# Patient Record
Sex: FEMALE | Race: WHITE | NOT HISPANIC OR LATINO
[De-identification: names, ages, dates, MRNs, and addresses within clinical notes are randomized per-mention and may not be internally consistent; named-entity substitution may affect disease eponyms.]

---

## 2017-03-30 ENCOUNTER — APPOINTMENT (OUTPATIENT)
Dept: OBGYN | Facility: CLINIC | Age: 22
End: 2017-03-30

## 2017-03-30 VITALS
DIASTOLIC BLOOD PRESSURE: 64 MMHG | OXYGEN SATURATION: 99 % | HEART RATE: 92 BPM | BODY MASS INDEX: 19.99 KG/M2 | SYSTOLIC BLOOD PRESSURE: 100 MMHG | WEIGHT: 135 LBS | HEIGHT: 69 IN

## 2017-03-30 DIAGNOSIS — N89.8 OTHER SPECIFIED NONINFLAMMATORY DISORDERS OF VAGINA: ICD-10-CM

## 2017-03-30 RX ORDER — DROSPIRENONE, ETHINYL ESTRADIOL AND LEVOMEFOLATE CALCIUM AND LEVOMEFOLATE CALCIUM 3-0.02(24)
3-0.02-0.451 KIT ORAL
Qty: 84 | Refills: 3 | Status: ACTIVE | COMMUNITY
Start: 2017-03-30 | End: 1900-01-01

## 2017-03-31 ENCOUNTER — OTHER (OUTPATIENT)
Age: 22
End: 2017-03-31

## 2017-03-31 ENCOUNTER — CLINICAL ADVICE (OUTPATIENT)
Age: 22
End: 2017-03-31

## 2017-03-31 LAB
CANDIDA VAG CYTO: NOT DETECTED
G VAGINALIS+PREV SP MTYP VAG QL MICRO: NOT DETECTED
T VAGINALIS VAG QL WET PREP: NOT DETECTED

## 2017-04-03 LAB
BACTERIA UR CULT: NORMAL
C TRACH RRNA SPEC QL NAA+PROBE: NORMAL
N GONORRHOEA RRNA SPEC QL NAA+PROBE: NORMAL
SOURCE AMPLIFICATION: NORMAL

## 2017-04-04 ENCOUNTER — OTHER (OUTPATIENT)
Age: 22
End: 2017-04-04

## 2017-06-12 ENCOUNTER — OTHER (OUTPATIENT)
Age: 22
End: 2017-06-12

## 2017-06-20 ENCOUNTER — OTHER (OUTPATIENT)
Age: 22
End: 2017-06-20

## 2018-03-02 ENCOUNTER — APPOINTMENT (OUTPATIENT)
Dept: OTOLARYNGOLOGY | Facility: CLINIC | Age: 23
End: 2018-03-02
Payer: COMMERCIAL

## 2018-03-02 VITALS
HEIGHT: 69 IN | SYSTOLIC BLOOD PRESSURE: 106 MMHG | OXYGEN SATURATION: 99 % | HEART RATE: 70 BPM | WEIGHT: 136 LBS | DIASTOLIC BLOOD PRESSURE: 74 MMHG | BODY MASS INDEX: 20.14 KG/M2 | RESPIRATION RATE: 16 BRPM

## 2018-03-02 DIAGNOSIS — J30.81 ALLERGIC RHINITIS DUE TO ANIMAL (CAT) (DOG) HAIR AND DANDER: ICD-10-CM

## 2018-03-02 DIAGNOSIS — J01.41 ACUTE RECURRENT PANSINUSITIS: ICD-10-CM

## 2018-03-02 DIAGNOSIS — J30.89 OTHER ALLERGIC RHINITIS: ICD-10-CM

## 2018-03-02 DIAGNOSIS — J30.1 ALLERGIC RHINITIS DUE TO POLLEN: ICD-10-CM

## 2018-03-02 DIAGNOSIS — J34.2 DEVIATED NASAL SEPTUM: ICD-10-CM

## 2018-03-02 PROCEDURE — 99204 OFFICE O/P NEW MOD 45 MIN: CPT | Mod: 25

## 2018-03-02 PROCEDURE — 31231 NASAL ENDOSCOPY DX: CPT

## 2018-03-02 RX ORDER — FLUTICASONE PROPIONATE 50 UG/1
50 SPRAY, METERED NASAL
Refills: 0 | Status: ACTIVE | COMMUNITY

## 2018-03-02 RX ORDER — FLUCONAZOLE 150 MG/1
150 TABLET ORAL
Qty: 1 | Refills: 0 | Status: ACTIVE | COMMUNITY
Start: 2018-03-02 | End: 1900-01-01

## 2018-03-02 RX ORDER — AMOXICILLIN AND CLAVULANATE POTASSIUM 875; 125 MG/1; MG/1
875-125 TABLET, COATED ORAL
Qty: 20 | Refills: 0 | Status: ACTIVE | COMMUNITY
Start: 2018-03-02 | End: 1900-01-01

## 2018-05-09 ENCOUNTER — APPOINTMENT (OUTPATIENT)
Dept: OBGYN | Facility: CLINIC | Age: 23
End: 2018-05-09
Payer: COMMERCIAL

## 2018-05-09 VITALS
HEIGHT: 69 IN | HEART RATE: 74 BPM | DIASTOLIC BLOOD PRESSURE: 56 MMHG | SYSTOLIC BLOOD PRESSURE: 108 MMHG | BODY MASS INDEX: 19.99 KG/M2 | WEIGHT: 135 LBS

## 2018-05-09 DIAGNOSIS — Z01.419 ENCOUNTER FOR GYNECOLOGICAL EXAMINATION (GENERAL) (ROUTINE) W/OUT ABNORMAL FINDINGS: ICD-10-CM

## 2018-05-09 DIAGNOSIS — Z30.41 ENCOUNTER FOR SURVEILLANCE OF CONTRACEPTIVE PILLS: ICD-10-CM

## 2018-05-09 PROCEDURE — 99395 PREV VISIT EST AGE 18-39: CPT

## 2018-05-09 RX ORDER — DROSPIRENONE/ETHINYL ESTRADIOL/LEVOMEFOLATE CALCIUM AND LEVOMEFOLATE CALCIUM 3-0.02(24)
3-0.02-0.451 KIT ORAL
Qty: 3 | Refills: 3 | Status: ACTIVE | COMMUNITY
Start: 2018-05-09 | End: 1900-01-01

## 2018-05-14 ENCOUNTER — MEDICATION RENEWAL (OUTPATIENT)
Age: 23
End: 2018-05-14

## 2018-05-14 ENCOUNTER — OTHER (OUTPATIENT)
Age: 23
End: 2018-05-14

## 2018-05-15 ENCOUNTER — OTHER (OUTPATIENT)
Age: 23
End: 2018-05-15

## 2018-05-16 ENCOUNTER — OTHER (OUTPATIENT)
Age: 23
End: 2018-05-16

## 2018-05-16 DIAGNOSIS — N76.0 ACUTE VAGINITIS: ICD-10-CM

## 2018-05-16 DIAGNOSIS — B96.89 ACUTE VAGINITIS: ICD-10-CM

## 2018-05-17 ENCOUNTER — OTHER (OUTPATIENT)
Age: 23
End: 2018-05-17

## 2018-05-17 LAB
C TRACH RRNA SPEC QL NAA+PROBE: NOT DETECTED
CANDIDA VAG CYTO: NOT DETECTED
CYTOLOGY CVX/VAG DOC THIN PREP: NORMAL
G VAGINALIS+PREV SP MTYP VAG QL MICRO: DETECTED
N GONORRHOEA RRNA SPEC QL NAA+PROBE: NOT DETECTED
SOURCE AMPLIFICATION: NORMAL
T VAGINALIS VAG QL WET PREP: NOT DETECTED

## 2018-05-17 RX ORDER — METRONIDAZOLE 7.5 MG/G
0.75 GEL VAGINAL
Qty: 1 | Refills: 0 | Status: ACTIVE | COMMUNITY
Start: 2018-05-16 | End: 1900-01-01

## 2018-05-17 RX ORDER — DROSPIRENONE/ETHINYL ESTRADIOL/LEVOMEFOLATE CALCIUM AND LEVOMEFOLATE CALCIUM 3-0.03(21)
3-0.03-0.451 KIT ORAL DAILY
Qty: 90 | Refills: 3 | Status: ACTIVE | COMMUNITY
Start: 2018-05-17 | End: 1900-01-01

## 2018-05-18 ENCOUNTER — OTHER (OUTPATIENT)
Age: 23
End: 2018-05-18

## 2018-05-21 ENCOUNTER — RX RENEWAL (OUTPATIENT)
Age: 23
End: 2018-05-21

## 2018-05-21 RX ORDER — DROSPIRENONE/ETHINYL ESTRADIOL/LEVOMEFOLATE CALCIUM AND LEVOMEFOLATE CALCIUM 3-0.03(21)
3-0.03-0.451 KIT ORAL DAILY
Qty: 28 | Refills: 6 | Status: ACTIVE | COMMUNITY
Start: 2018-05-21 | End: 1900-01-01

## 2018-05-24 ENCOUNTER — OTHER (OUTPATIENT)
Age: 23
End: 2018-05-24

## 2018-05-24 ENCOUNTER — CHART COPY (OUTPATIENT)
Age: 23
End: 2018-05-24

## 2019-04-29 ENCOUNTER — TRANSCRIPTION ENCOUNTER (OUTPATIENT)
Age: 24
End: 2019-04-29

## 2019-06-09 RX ORDER — DROSPIRENONE/ETHINYL ESTRADIOL/LEVOMEFOLATE CALCIUM AND LEVOMEFOLATE CALCIUM 3-0.03(21)
3-0.03-0.451 KIT ORAL
Qty: 1 | Refills: 0 | Status: ACTIVE | COMMUNITY
Start: 2018-05-24 | End: 1900-01-01

## 2020-03-05 ENCOUNTER — TELEPHONE (OUTPATIENT)
Dept: OBSTETRICS AND GYNECOLOGY | Facility: CLINIC | Age: 25
End: 2020-03-05

## 2020-03-05 NOTE — TELEPHONE ENCOUNTER
Left message informing patient that we need to push her appointment time back from 11 to 1:30pm on tomorrow. Dr. Jeansonne has an emergency surgery at that time.Left call back of [phone] and will send message on [facility].

## 2020-03-06 ENCOUNTER — PATIENT MESSAGE (OUTPATIENT)
Dept: OBSTETRICS AND GYNECOLOGY | Facility: CLINIC | Age: 25
End: 2020-03-06

## 2020-03-27 ENCOUNTER — OFFICE VISIT (OUTPATIENT)
Dept: OBSTETRICS AND GYNECOLOGY | Facility: CLINIC | Age: 25
End: 2020-03-27
Attending: OBSTETRICS & GYNECOLOGY
Payer: COMMERCIAL

## 2020-03-27 ENCOUNTER — PATIENT MESSAGE (OUTPATIENT)
Dept: OBSTETRICS AND GYNECOLOGY | Facility: CLINIC | Age: 25
End: 2020-03-27

## 2020-03-27 DIAGNOSIS — Z30.014 ENCOUNTER FOR INITIAL PRESCRIPTION OF INTRAUTERINE CONTRACEPTIVE DEVICE (IUD): Primary | ICD-10-CM

## 2020-03-27 DIAGNOSIS — N89.8 VAGINAL ODOR: ICD-10-CM

## 2020-03-27 PROCEDURE — 99203 OFFICE O/P NEW LOW 30 MIN: CPT | Mod: 95,,, | Performed by: OBSTETRICS & GYNECOLOGY

## 2020-03-27 PROCEDURE — 99203 PR OFFICE/OUTPT VISIT, NEW, LEVL III, 30-44 MIN: ICD-10-PCS | Mod: 95,,, | Performed by: OBSTETRICS & GYNECOLOGY

## 2020-03-27 RX ORDER — METRONIDAZOLE 7.5 MG/G
1 GEL VAGINAL NIGHTLY
Qty: 70 G | Refills: 0 | Status: SHIPPED | OUTPATIENT
Start: 2020-03-27 | End: 2020-04-01

## 2020-03-27 NOTE — PROGRESS NOTES
The patient location is: home  The chief complaint leading to consultation is: IUD consult, vaginal odor  Visit type: Virtual visit with synchronous audio and video  Total time spent with patient: 15 min  Each patient to whom he or she provides medical services by telemedicine is:  (1) informed of the relationship between the physician and patient and the respective role of any other health care provider with respect to management of the patient; and (2) notified that he or she may decline to receive medical services by telemedicine and may withdraw from such care at any time.    Notes:     CC: IUD consult, Vaginal odor, h/o BV    Precious Perez is a 24 y.o. female G0 presents to discuss IUD options. She has been on Loloestrin and Beyaz since age 15yo and wants a non-hormonal method, thinks she wants paraguard. Insurance does not cover BC so will be paying out of pocket.    Also c/o vaginal discharge and odor, has had BV in past and thinks this is it again.    Has not seen an OB/GYN in 2 years.    History reviewed. No pertinent past medical history.  History reviewed. No pertinent surgical history.  Social History     Tobacco Use    Smoking status: Not on file   Substance Use Topics    Alcohol use: Not on file    Drug use: Not on file     History reviewed. No pertinent family history.  OB History   No data available       There were no vitals taken for this visit.    ROS:  GENERAL: No fever, chills, fatigability or weight loss.  VULVAR: No pain, no lesions and no itching.  VAGINAL: No relaxation, no itching, no discharge, no abnormal bleeding and no lesions.  ABDOMEN: No abdominal pain. Denies nausea. Denies vomiting. No diarrhea. No constipation  BREAST: Denies pain. No lumps. No discharge.  URINARY: No incontinence, no nocturia, no frequency and no dysuria.  CARDIOVASCULAR: No chest pain. No shortness of breath. No leg cramps.  NEUROLOGICAL: No headaches. No vision changes.    PHYSICAL EXAM:  GEN: NAD  Resp:  nl effort  Abd: deferred, televisit  Pelvic: deferred, televisit  Psych: nl affect    ASSESSMENT and PLAN:    ICD-10-CM ICD-9-CM    1. Encounter for initial prescription of intrauterine contraceptive device (IUD) Z30.014 V25.02    2. Vaginal odor N89.8 625.8        We Discussed in depth the types of IUDs. We thoroughly reviewed the risks and benefits of all options and all questions were answered.     The patient has opted to proceed with paraguard for her birth control. Order placed, will let pt know cost and will schedule for placement with cytotec.    Metrogel sent to pharmacy for suspected BV.    Patient was counseled today on ACS guidelines and recommendations for yearly pelvic exams, mammograms and monthly self breast exams; to see her PCP for other health maintenance.

## 2020-04-02 ENCOUNTER — PATIENT MESSAGE (OUTPATIENT)
Dept: OBSTETRICS AND GYNECOLOGY | Facility: CLINIC | Age: 25
End: 2020-04-02

## 2020-08-06 NOTE — PROGRESS NOTES
"  Subjective:       Patient ID:  Precious Perez is a 24 y.o. female who presents for   Chief Complaint   Patient presents with    Acne     The patient location is: home  The chief complaint leading to consultation is: "acne"  Visit type: virtual visit with synchronous audio and video  Total time spent with patient: 11 minutes  Each patient to whom I provide medical services by telemedicine is:  (1) informed of the relationship between the physician and patient and the respective role of any other health care provider with respect to management of the patient; and (2) notified that he or she may decline to receive medical services by telemedicine and may withdraw from such care at any time.      Acne - Initial  Affected locations: face and chin  Duration: In the past month.  Signs / symptoms: inflamed, pain and redness  Severity: mild to moderate  Timing: It is always present, but gets better and worse.  Aggravated by: picking and stress  Treatments tried: azelaic acid, zinc, topical clindamycin.  Improvement on treatment: no relief      Review of Systems   Eyes: Negative for eyelid inflammation.   Skin: Negative for itching and rash.        Objective:    Physical Exam   Constitutional: She appears well-developed and well-nourished. No distress.   HENT:   Mouth/Throat: Lips normal.    Eyes: Lids are normal.  No conjunctival no injection.   Neurological: She is alert and oriented to person, place, and time. She is not disoriented.   Psychiatric: She has a normal mood and affect.   Skin:   Areas Examined (abnormalities noted in diagram):   Head / Face Inspection Performed  Neck Inspection Performed                 Diagram Legend     Erythematous scaling macule/papule c/w actinic keratosis       Vascular papule c/w angioma      Pigmented verrucoid papule/plaque c/w seborrheic keratosis      Yellow umbilicated papule c/w sebaceous hyperplasia      Irregularly shaped tan macule c/w lentigo     1-2 mm smooth white " papules consistent with Milia      Movable subcutaneous cyst with punctum c/w epidermal inclusion cyst      Subcutaneous movable cyst c/w pilar cyst      Firm pink to brown papule c/w dermatofibroma      Pedunculated fleshy papule(s) c/w skin tag(s)      Evenly pigmented macule c/w junctional nevus     Mildly variegated pigmented, slightly irregular-bordered macule c/w mildly atypical nevus      Flesh colored to evenly pigmented papule c/w intradermal nevus       Pink pearly papule/plaque c/w basal cell carcinoma      Erythematous hyperkeratotic cursted plaque c/w SCC      Surgical scar with no sign of skin cancer recurrence      Open and closed comedones      Inflammatory papules and pustules      Verrucoid papule consistent consistent with wart     Erythematous eczematous patches and plaques     Dystrophic onycholytic nail with subungual debris c/w onychomycosis     Umbilicated papule    Erythematous-base heme-crusted tan verrucoid plaque consistent with inflamed seborrheic keratosis     Erythematous Silvery Scaling Plaque c/w Psoriasis     See annotation      Assessment / Plan:        Periorificial dermatitis  -     doxycycline (PERIOSTAT) 20 MG tablet; Take 1 tablet (20 mg total) by mouth 2 (two) times daily.  Dispense: 60 tablet; Refill: 1  Prescribed azelaic acid 15% + ivermectin 1% + metronidazole 1% cream. Apply to face daily - BID.    Follow up in about 2 months (around 10/7/2020).

## 2020-08-07 ENCOUNTER — OFFICE VISIT (OUTPATIENT)
Dept: DERMATOLOGY | Facility: CLINIC | Age: 25
End: 2020-08-07
Payer: COMMERCIAL

## 2020-08-07 DIAGNOSIS — L71.0 PERIORIFICIAL DERMATITIS: Primary | ICD-10-CM

## 2020-08-07 PROCEDURE — 99202 PR OFFICE/OUTPT VISIT, NEW, LEVL II, 15-29 MIN: ICD-10-PCS | Mod: 95,,, | Performed by: DERMATOLOGY

## 2020-08-07 PROCEDURE — 99202 OFFICE O/P NEW SF 15 MIN: CPT | Mod: 95,,, | Performed by: DERMATOLOGY

## 2020-08-07 RX ORDER — DOXYCYCLINE HYCLATE 20 MG
20 TABLET ORAL 2 TIMES DAILY
Qty: 60 TABLET | Refills: 1 | Status: SHIPPED | OUTPATIENT
Start: 2020-08-07 | End: 2021-10-28

## 2020-08-07 RX ORDER — AZELASTINE HYDROCHLORIDE, FLUTICASONE PROPIONATE 137; 50 UG/1; UG/1
SPRAY, METERED NASAL
COMMUNITY
Start: 2020-07-08

## 2020-08-07 RX ORDER — MONTELUKAST SODIUM 5 MG/1
TABLET, CHEWABLE ORAL
COMMUNITY
Start: 2020-07-03

## 2020-08-07 RX ORDER — DESLORATADINE 5 MG/1
TABLET ORAL
COMMUNITY
Start: 2020-07-10

## 2020-12-16 PROBLEM — Z01.419 ENCOUNTER FOR ANNUAL ROUTINE GYNECOLOGICAL EXAMINATION: Status: RESOLVED | Noted: 2017-03-30 | Resolved: 2020-12-16

## 2020-12-16 PROBLEM — N76.0 BACTERIAL VAGINOSIS: Status: RESOLVED | Noted: 2018-05-16 | Resolved: 2020-12-16

## 2021-03-01 ENCOUNTER — CLINICAL SUPPORT (OUTPATIENT)
Dept: URGENT CARE | Facility: CLINIC | Age: 26
End: 2021-03-01

## 2021-03-01 DIAGNOSIS — Z02.89 ENCOUNTER FOR PHYSICAL EXAMINATION RELATED TO EMPLOYMENT: Primary | ICD-10-CM

## 2021-03-01 DIAGNOSIS — Z02.83 ENCOUNTER FOR DRUG SCREENING: ICD-10-CM

## 2021-03-01 PROCEDURE — 99499 UNLISTED E&M SERVICE: CPT | Mod: S$GLB,,, | Performed by: PHYSICIAN ASSISTANT

## 2021-03-01 PROCEDURE — 80306 OOH DOT DRUG SCREEN: ICD-10-PCS | Mod: S$GLB,,, | Performed by: PHYSICIAN ASSISTANT

## 2021-03-01 PROCEDURE — 99499 COAST GUARD PHYSICAL: ICD-10-PCS | Mod: S$GLB,,, | Performed by: PHYSICIAN ASSISTANT

## 2021-03-01 PROCEDURE — 80306 DRUG TEST PRSMV INSTRMNT: CPT | Mod: S$GLB,,, | Performed by: PHYSICIAN ASSISTANT

## 2021-04-15 ENCOUNTER — OFFICE VISIT (OUTPATIENT)
Dept: DERMATOLOGY | Facility: CLINIC | Age: 26
End: 2021-04-15
Payer: COMMERCIAL

## 2021-04-15 DIAGNOSIS — L98.9 DISEASE OF SKIN AND SUBCUTANEOUS TISSUE: Primary | ICD-10-CM

## 2021-04-15 PROCEDURE — 1126F PR PAIN SEVERITY QUANTIFIED, NO PAIN PRESENT: ICD-10-PCS | Mod: S$GLB,,, | Performed by: DERMATOLOGY

## 2021-04-15 PROCEDURE — 11900 INJECT SKIN LESIONS </W 7: CPT | Mod: S$GLB,,, | Performed by: DERMATOLOGY

## 2021-04-15 PROCEDURE — 1126F AMNT PAIN NOTED NONE PRSNT: CPT | Mod: S$GLB,,, | Performed by: DERMATOLOGY

## 2021-04-15 PROCEDURE — 99499 UNLISTED E&M SERVICE: CPT | Mod: S$GLB,,, | Performed by: DERMATOLOGY

## 2021-04-15 PROCEDURE — 99999 PR PBB SHADOW E&M-EST. PATIENT-LVL II: ICD-10-PCS | Mod: PBBFAC,,, | Performed by: DERMATOLOGY

## 2021-04-15 PROCEDURE — 99499 NO LOS: ICD-10-PCS | Mod: S$GLB,,, | Performed by: DERMATOLOGY

## 2021-04-15 PROCEDURE — 99999 PR PBB SHADOW E&M-EST. PATIENT-LVL II: CPT | Mod: PBBFAC,,, | Performed by: DERMATOLOGY

## 2021-04-15 PROCEDURE — 11900 PR INJECTION INTO SKIN LESIONS, UP TO 7: ICD-10-PCS | Mod: S$GLB,,, | Performed by: DERMATOLOGY

## 2021-05-18 ENCOUNTER — OFFICE VISIT (OUTPATIENT)
Dept: OPTOMETRY | Facility: CLINIC | Age: 26
End: 2021-05-18
Payer: COMMERCIAL

## 2021-05-18 DIAGNOSIS — Z97.3 WEARS CONTACT LENSES: ICD-10-CM

## 2021-05-18 DIAGNOSIS — H52.7 REFRACTIVE ERROR: ICD-10-CM

## 2021-05-18 DIAGNOSIS — H53.10 SUBJECTIVE VISUAL DISTURBANCE: Primary | ICD-10-CM

## 2021-05-18 DIAGNOSIS — H52.31 ANISOMETROPIA: ICD-10-CM

## 2021-05-18 DIAGNOSIS — Z46.0 ENCOUNTER FOR FITTING OR ADJUSTMENT OF SPECTACLES OR CONTACT LENSES: Primary | ICD-10-CM

## 2021-05-18 PROCEDURE — 99999 PR PBB SHADOW E&M-EST. PATIENT-LVL II: ICD-10-PCS | Mod: PBBFAC,,, | Performed by: OPTOMETRIST

## 2021-05-18 PROCEDURE — 92310 PR CONTACT LENS FITTING (NO CHANGE): ICD-10-PCS | Mod: CSM,,, | Performed by: OPTOMETRIST

## 2021-05-18 PROCEDURE — 1126F PR PAIN SEVERITY QUANTIFIED, NO PAIN PRESENT: ICD-10-PCS | Mod: S$GLB,,, | Performed by: OPTOMETRIST

## 2021-05-18 PROCEDURE — 92310 CONTACT LENS FITTING OU: CPT | Mod: CSM,,, | Performed by: OPTOMETRIST

## 2021-05-18 PROCEDURE — 99499 NO LOS: ICD-10-PCS | Mod: S$GLB,,, | Performed by: OPTOMETRIST

## 2021-05-18 PROCEDURE — 99999 PR PBB SHADOW E&M-EST. PATIENT-LVL II: CPT | Mod: PBBFAC,,, | Performed by: OPTOMETRIST

## 2021-05-18 PROCEDURE — 92004 PR EYE EXAM, NEW PATIENT,COMPREHESV: ICD-10-PCS | Mod: S$GLB,,, | Performed by: OPTOMETRIST

## 2021-05-18 PROCEDURE — 1126F AMNT PAIN NOTED NONE PRSNT: CPT | Mod: S$GLB,,, | Performed by: OPTOMETRIST

## 2021-05-18 PROCEDURE — 99499 UNLISTED E&M SERVICE: CPT | Mod: S$GLB,,, | Performed by: OPTOMETRIST

## 2021-05-18 PROCEDURE — 92004 COMPRE OPH EXAM NEW PT 1/>: CPT | Mod: S$GLB,,, | Performed by: OPTOMETRIST

## 2021-05-20 ENCOUNTER — TELEPHONE (OUTPATIENT)
Dept: OPTOMETRY | Facility: CLINIC | Age: 26
End: 2021-05-20

## 2021-05-20 ENCOUNTER — PATIENT MESSAGE (OUTPATIENT)
Dept: OPTOMETRY | Facility: CLINIC | Age: 26
End: 2021-05-20

## 2021-05-27 ENCOUNTER — PATIENT OUTREACH (OUTPATIENT)
Dept: ADMINISTRATIVE | Facility: HOSPITAL | Age: 26
End: 2021-05-27

## 2021-06-05 ENCOUNTER — NURSE TRIAGE (OUTPATIENT)
Dept: ADMINISTRATIVE | Facility: CLINIC | Age: 26
End: 2021-06-05

## 2021-06-07 ENCOUNTER — OFFICE VISIT (OUTPATIENT)
Dept: FAMILY MEDICINE | Facility: CLINIC | Age: 26
End: 2021-06-07
Payer: COMMERCIAL

## 2021-06-07 VITALS
OXYGEN SATURATION: 98 % | BODY MASS INDEX: 21.57 KG/M2 | DIASTOLIC BLOOD PRESSURE: 62 MMHG | HEART RATE: 80 BPM | TEMPERATURE: 98 F | SYSTOLIC BLOOD PRESSURE: 116 MMHG | WEIGHT: 145.63 LBS | HEIGHT: 69 IN

## 2021-06-07 DIAGNOSIS — M62.830 LUMBAR PARASPINAL MUSCLE SPASM: ICD-10-CM

## 2021-06-07 DIAGNOSIS — Z00.00 ENCOUNTER FOR MEDICAL EXAMINATION TO ESTABLISH CARE: Primary | ICD-10-CM

## 2021-06-07 DIAGNOSIS — Z80.3 FAMILY HISTORY OF BREAST CANCER: ICD-10-CM

## 2021-06-07 PROCEDURE — 3008F BODY MASS INDEX DOCD: CPT | Mod: CPTII,S$GLB,, | Performed by: FAMILY MEDICINE

## 2021-06-07 PROCEDURE — 3008F PR BODY MASS INDEX (BMI) DOCUMENTED: ICD-10-PCS | Mod: CPTII,S$GLB,, | Performed by: FAMILY MEDICINE

## 2021-06-07 PROCEDURE — 1125F AMNT PAIN NOTED PAIN PRSNT: CPT | Mod: S$GLB,,, | Performed by: FAMILY MEDICINE

## 2021-06-07 PROCEDURE — 1125F PR PAIN SEVERITY QUANTIFIED, PAIN PRESENT: ICD-10-PCS | Mod: S$GLB,,, | Performed by: FAMILY MEDICINE

## 2021-06-07 PROCEDURE — 99999 PR PBB SHADOW E&M-EST. PATIENT-LVL IV: CPT | Mod: PBBFAC,,, | Performed by: FAMILY MEDICINE

## 2021-06-07 PROCEDURE — 99203 OFFICE O/P NEW LOW 30 MIN: CPT | Mod: S$GLB,,, | Performed by: FAMILY MEDICINE

## 2021-06-07 PROCEDURE — 99999 PR PBB SHADOW E&M-EST. PATIENT-LVL IV: ICD-10-PCS | Mod: PBBFAC,,, | Performed by: FAMILY MEDICINE

## 2021-06-07 PROCEDURE — 99203 PR OFFICE/OUTPT VISIT, NEW, LEVL III, 30-44 MIN: ICD-10-PCS | Mod: S$GLB,,, | Performed by: FAMILY MEDICINE

## 2021-06-07 RX ORDER — MELOXICAM 15 MG/1
15 TABLET ORAL DAILY PRN
Qty: 90 TABLET | Refills: 3 | Status: SHIPPED | OUTPATIENT
Start: 2021-06-07 | End: 2023-03-03

## 2021-06-07 RX ORDER — SPIRONOLACTONE 50 MG/1
50 TABLET, FILM COATED ORAL 2 TIMES DAILY
COMMUNITY
End: 2022-07-08

## 2021-06-07 RX ORDER — TIZANIDINE 2 MG/1
TABLET ORAL
Qty: 60 TABLET | Refills: 0 | Status: SHIPPED | OUTPATIENT
Start: 2021-06-07 | End: 2023-03-03

## 2021-06-10 ENCOUNTER — TELEPHONE (OUTPATIENT)
Dept: FAMILY MEDICINE | Facility: CLINIC | Age: 26
End: 2021-06-10

## 2021-06-18 ENCOUNTER — TELEPHONE (OUTPATIENT)
Dept: FAMILY MEDICINE | Facility: CLINIC | Age: 26
End: 2021-06-18

## 2021-10-13 ENCOUNTER — OFFICE VISIT (OUTPATIENT)
Dept: OBSTETRICS AND GYNECOLOGY | Facility: CLINIC | Age: 26
End: 2021-10-13
Payer: COMMERCIAL

## 2021-10-13 VITALS
BODY MASS INDEX: 20.61 KG/M2 | WEIGHT: 139.13 LBS | SYSTOLIC BLOOD PRESSURE: 120 MMHG | HEIGHT: 69 IN | DIASTOLIC BLOOD PRESSURE: 82 MMHG

## 2021-10-13 DIAGNOSIS — Z11.3 SCREEN FOR STD (SEXUALLY TRANSMITTED DISEASE): ICD-10-CM

## 2021-10-13 DIAGNOSIS — Z01.419 ENCOUNTER FOR ANNUAL ROUTINE GYNECOLOGICAL EXAMINATION: Primary | ICD-10-CM

## 2021-10-13 LAB
C TRACH DNA SPEC QL NAA+PROBE: NOT DETECTED
N GONORRHOEA DNA SPEC QL NAA+PROBE: NOT DETECTED

## 2021-10-13 PROCEDURE — 99395 PREV VISIT EST AGE 18-39: CPT | Mod: S$GLB,,, | Performed by: OBSTETRICS & GYNECOLOGY

## 2021-10-13 PROCEDURE — 1159F MED LIST DOCD IN RCRD: CPT | Mod: CPTII,S$GLB,, | Performed by: OBSTETRICS & GYNECOLOGY

## 2021-10-13 PROCEDURE — 87491 CHLMYD TRACH DNA AMP PROBE: CPT | Performed by: OBSTETRICS & GYNECOLOGY

## 2021-10-13 PROCEDURE — 3079F DIAST BP 80-89 MM HG: CPT | Mod: CPTII,S$GLB,, | Performed by: OBSTETRICS & GYNECOLOGY

## 2021-10-13 PROCEDURE — 1159F PR MEDICATION LIST DOCUMENTED IN MEDICAL RECORD: ICD-10-PCS | Mod: CPTII,S$GLB,, | Performed by: OBSTETRICS & GYNECOLOGY

## 2021-10-13 PROCEDURE — 3074F PR MOST RECENT SYSTOLIC BLOOD PRESSURE < 130 MM HG: ICD-10-PCS | Mod: CPTII,S$GLB,, | Performed by: OBSTETRICS & GYNECOLOGY

## 2021-10-13 PROCEDURE — 88175 CYTOPATH C/V AUTO FLUID REDO: CPT | Performed by: OBSTETRICS & GYNECOLOGY

## 2021-10-13 PROCEDURE — 3079F PR MOST RECENT DIASTOLIC BLOOD PRESSURE 80-89 MM HG: ICD-10-PCS | Mod: CPTII,S$GLB,, | Performed by: OBSTETRICS & GYNECOLOGY

## 2021-10-13 PROCEDURE — 1160F RVW MEDS BY RX/DR IN RCRD: CPT | Mod: CPTII,S$GLB,, | Performed by: OBSTETRICS & GYNECOLOGY

## 2021-10-13 PROCEDURE — 99999 PR PBB SHADOW E&M-EST. PATIENT-LVL III: ICD-10-PCS | Mod: PBBFAC,,, | Performed by: OBSTETRICS & GYNECOLOGY

## 2021-10-13 PROCEDURE — 99395 PR PREVENTIVE VISIT,EST,18-39: ICD-10-PCS | Mod: S$GLB,,, | Performed by: OBSTETRICS & GYNECOLOGY

## 2021-10-13 PROCEDURE — 99999 PR PBB SHADOW E&M-EST. PATIENT-LVL III: CPT | Mod: PBBFAC,,, | Performed by: OBSTETRICS & GYNECOLOGY

## 2021-10-13 PROCEDURE — 3074F SYST BP LT 130 MM HG: CPT | Mod: CPTII,S$GLB,, | Performed by: OBSTETRICS & GYNECOLOGY

## 2021-10-13 PROCEDURE — 87591 N.GONORRHOEAE DNA AMP PROB: CPT | Performed by: OBSTETRICS & GYNECOLOGY

## 2021-10-13 PROCEDURE — 3008F PR BODY MASS INDEX (BMI) DOCUMENTED: ICD-10-PCS | Mod: CPTII,S$GLB,, | Performed by: OBSTETRICS & GYNECOLOGY

## 2021-10-13 PROCEDURE — 87481 CANDIDA DNA AMP PROBE: CPT | Mod: 59 | Performed by: OBSTETRICS & GYNECOLOGY

## 2021-10-13 PROCEDURE — 1160F PR REVIEW ALL MEDS BY PRESCRIBER/CLIN PHARMACIST DOCUMENTED: ICD-10-PCS | Mod: CPTII,S$GLB,, | Performed by: OBSTETRICS & GYNECOLOGY

## 2021-10-13 PROCEDURE — 3008F BODY MASS INDEX DOCD: CPT | Mod: CPTII,S$GLB,, | Performed by: OBSTETRICS & GYNECOLOGY

## 2021-10-18 LAB
BACTERIAL VAGINOSIS DNA: NEGATIVE
CANDIDA GLABRATA DNA: NEGATIVE
CANDIDA KRUSEI DNA: NEGATIVE
CANDIDA RRNA VAG QL PROBE: NEGATIVE
T VAGINALIS RRNA GENITAL QL PROBE: NEGATIVE

## 2021-10-20 LAB
CYTOLOGIST CVX/VAG CYTO: NORMAL
CYTOLOGY CVX/VAG DOC CYTO: NORMAL
CYTOLOGY CVX/VAG DOC THIN PREP: NORMAL
CYTOLOGY THINPREP PAP COMMENT: NORMAL
CYTOLOGY THINPREP PAP COMMENT: NORMAL
PAP NOTE: NORMAL
PATHOLOGIST CVX/VAG CYTO: NORMAL
STAT OF ADQ CVX/VAG CYTO-IMP: NORMAL

## 2021-10-27 ENCOUNTER — TELEPHONE (OUTPATIENT)
Dept: HEMATOLOGY/ONCOLOGY | Facility: CLINIC | Age: 26
End: 2021-10-27
Payer: COMMERCIAL

## 2021-10-28 ENCOUNTER — PATIENT MESSAGE (OUTPATIENT)
Dept: HEMATOLOGY/ONCOLOGY | Facility: CLINIC | Age: 26
End: 2021-10-28

## 2021-10-28 ENCOUNTER — OFFICE VISIT (OUTPATIENT)
Dept: HEMATOLOGY/ONCOLOGY | Facility: CLINIC | Age: 26
End: 2021-10-28
Payer: COMMERCIAL

## 2021-10-28 DIAGNOSIS — Z71.83 ENCOUNTER FOR NONPROCREATIVE GENETIC COUNSELING: Primary | ICD-10-CM

## 2021-10-28 DIAGNOSIS — Z80.3 FAMILY HISTORY OF BREAST CANCER: ICD-10-CM

## 2021-10-28 DIAGNOSIS — Z91.89 INCREASED RISK OF BREAST CANCER: ICD-10-CM

## 2021-10-28 DIAGNOSIS — Z80.42 FAMILY HISTORY OF PROSTATE CANCER: ICD-10-CM

## 2021-10-28 PROCEDURE — 99999 PR PBB SHADOW E&M-EST. PATIENT-LVL III: ICD-10-PCS | Mod: PBBFAC,,, | Performed by: NURSE PRACTITIONER

## 2021-10-28 PROCEDURE — 99999 PR PBB SHADOW E&M-EST. PATIENT-LVL III: CPT | Mod: PBBFAC,,, | Performed by: NURSE PRACTITIONER

## 2021-10-28 PROCEDURE — 1159F MED LIST DOCD IN RCRD: CPT | Mod: CPTII,S$GLB,, | Performed by: NURSE PRACTITIONER

## 2021-10-28 PROCEDURE — 1159F PR MEDICATION LIST DOCUMENTED IN MEDICAL RECORD: ICD-10-PCS | Mod: CPTII,S$GLB,, | Performed by: NURSE PRACTITIONER

## 2021-10-28 PROCEDURE — 99204 PR OFFICE/OUTPT VISIT, NEW, LEVL IV, 45-59 MIN: ICD-10-PCS | Mod: S$GLB,,, | Performed by: NURSE PRACTITIONER

## 2021-10-28 PROCEDURE — 99204 OFFICE O/P NEW MOD 45 MIN: CPT | Mod: S$GLB,,, | Performed by: NURSE PRACTITIONER

## 2021-10-28 RX ORDER — TRIAMCINOLONE ACETONIDE 1 MG/G
OINTMENT TOPICAL
COMMUNITY
Start: 2021-06-22 | End: 2023-03-03

## 2021-10-28 RX ORDER — TRETINOIN 0.25 MG/G
1 CREAM TOPICAL NIGHTLY
COMMUNITY
Start: 2021-06-14 | End: 2023-03-03

## 2021-12-06 ENCOUNTER — TELEPHONE (OUTPATIENT)
Dept: HEMATOLOGY/ONCOLOGY | Facility: CLINIC | Age: 26
End: 2021-12-06
Payer: COMMERCIAL

## 2022-01-01 ENCOUNTER — PATIENT MESSAGE (OUTPATIENT)
Dept: HEMATOLOGY/ONCOLOGY | Facility: CLINIC | Age: 27
End: 2022-01-01
Payer: COMMERCIAL

## 2022-01-21 ENCOUNTER — PATIENT MESSAGE (OUTPATIENT)
Dept: FAMILY MEDICINE | Facility: CLINIC | Age: 27
End: 2022-01-21
Payer: COMMERCIAL

## 2022-01-21 ENCOUNTER — PATIENT MESSAGE (OUTPATIENT)
Dept: HEMATOLOGY/ONCOLOGY | Facility: CLINIC | Age: 27
End: 2022-01-21
Payer: COMMERCIAL

## 2022-01-24 ENCOUNTER — PATIENT MESSAGE (OUTPATIENT)
Dept: FAMILY MEDICINE | Facility: CLINIC | Age: 27
End: 2022-01-24
Payer: COMMERCIAL

## 2022-02-17 ENCOUNTER — OFFICE VISIT (OUTPATIENT)
Dept: HEMATOLOGY/ONCOLOGY | Facility: CLINIC | Age: 27
End: 2022-02-17
Payer: COMMERCIAL

## 2022-02-17 VITALS
BODY MASS INDEX: 22.3 KG/M2 | WEIGHT: 150.56 LBS | HEIGHT: 69 IN | SYSTOLIC BLOOD PRESSURE: 118 MMHG | DIASTOLIC BLOOD PRESSURE: 63 MMHG

## 2022-02-17 DIAGNOSIS — Z91.89 INCREASED RISK OF BREAST CANCER: ICD-10-CM

## 2022-02-17 PROCEDURE — 3074F PR MOST RECENT SYSTOLIC BLOOD PRESSURE < 130 MM HG: ICD-10-PCS | Mod: CPTII,S$GLB,, | Performed by: PHYSICIAN ASSISTANT

## 2022-02-17 PROCEDURE — 3074F SYST BP LT 130 MM HG: CPT | Mod: CPTII,S$GLB,, | Performed by: PHYSICIAN ASSISTANT

## 2022-02-17 PROCEDURE — 1160F PR REVIEW ALL MEDS BY PRESCRIBER/CLIN PHARMACIST DOCUMENTED: ICD-10-PCS | Mod: CPTII,S$GLB,, | Performed by: PHYSICIAN ASSISTANT

## 2022-02-17 PROCEDURE — 99999 PR PBB SHADOW E&M-EST. PATIENT-LVL III: ICD-10-PCS | Mod: PBBFAC,,, | Performed by: PHYSICIAN ASSISTANT

## 2022-02-17 PROCEDURE — 99999 PR PBB SHADOW E&M-EST. PATIENT-LVL III: CPT | Mod: PBBFAC,,, | Performed by: PHYSICIAN ASSISTANT

## 2022-02-17 PROCEDURE — 3078F DIAST BP <80 MM HG: CPT | Mod: CPTII,S$GLB,, | Performed by: PHYSICIAN ASSISTANT

## 2022-02-17 PROCEDURE — 1159F MED LIST DOCD IN RCRD: CPT | Mod: CPTII,S$GLB,, | Performed by: PHYSICIAN ASSISTANT

## 2022-02-17 PROCEDURE — 3078F PR MOST RECENT DIASTOLIC BLOOD PRESSURE < 80 MM HG: ICD-10-PCS | Mod: CPTII,S$GLB,, | Performed by: PHYSICIAN ASSISTANT

## 2022-02-17 PROCEDURE — 3008F PR BODY MASS INDEX (BMI) DOCUMENTED: ICD-10-PCS | Mod: CPTII,S$GLB,, | Performed by: PHYSICIAN ASSISTANT

## 2022-02-17 PROCEDURE — 99214 PR OFFICE/OUTPT VISIT, EST, LEVL IV, 30-39 MIN: ICD-10-PCS | Mod: S$GLB,,, | Performed by: PHYSICIAN ASSISTANT

## 2022-02-17 PROCEDURE — 1160F RVW MEDS BY RX/DR IN RCRD: CPT | Mod: CPTII,S$GLB,, | Performed by: PHYSICIAN ASSISTANT

## 2022-02-17 PROCEDURE — 99214 OFFICE O/P EST MOD 30 MIN: CPT | Mod: S$GLB,,, | Performed by: PHYSICIAN ASSISTANT

## 2022-02-17 PROCEDURE — 1159F PR MEDICATION LIST DOCUMENTED IN MEDICAL RECORD: ICD-10-PCS | Mod: CPTII,S$GLB,, | Performed by: PHYSICIAN ASSISTANT

## 2022-02-17 PROCEDURE — 3008F BODY MASS INDEX DOCD: CPT | Mod: CPTII,S$GLB,, | Performed by: PHYSICIAN ASSISTANT

## 2022-02-17 NOTE — PROGRESS NOTES
History:     Reason For Consultation:   Increased lifetime risk of breast cancer    Referring Provider:   Sunny Lei, LATRICE  [address] [place] [address]    HPI:   Precious Perez presents for consultation of increased risk of breast cancer. She met with Sunny Lei NP to discuss genetic testing due to family history of cancer who referred her for increased risk of breast cancer. Has not yet done genetic testing but planning on soon. No prior breast imaging. Denies breast mass, changes in skin or nipple, or drainage.    Personal history of cancer: no  Prior chest wall radiation at ages 10-30: no  Genetic testing: None- scheduling testing with Sunny  [facility]  OB/Gyn history:    Number of pregnancies & age at first gestation:    Age of menarche: 11 y/o  Age of menopause: N/a   Body mass index is 22.24 kg/m².   HRT Use: N/a   Breastfeeding: N/a   Number of previous biopsies: none   Breast Density: Unknown    Family History:  MGM- Breast cancer at 50 ( at 55)  Maternal Uncle- Prostate cancer; breast cancer ( at 45)  MGF- Prostate and liver cancer  PGM- Breast cancer (still living at 92)  Maternal Great aunt- Breast cancer  Maternal Great aunt- Breast cancer ()    Tyrer-Cuzick Score:   29.2% (re-calculated in clinic today).   Brandy Model 5 Year Risk: Unable to calculate due to age < 35    Past Medical   Past Medical History:   Diagnosis Date    Acne     Back injury     Environmental allergies      Patient Active Problem List   Diagnosis    Refractive error    Wears contact lenses       Social History   Social History     Tobacco Use    Smoking status: Never Smoker    Smokeless tobacco: Never Used       Family History  Family History   Problem Relation Age of Onset    Diabetes type II Father     ADD / ADHD Other     Prostate cancer Maternal Uncle         dx 50s, metastasized    Other Maternal Uncle         suspected colon polyps    Breast cancer Maternal  Grandmother         dx 50s (uni/bilat?), thinks recurred in (same?) breast and distantly    Cancer Maternal Grandfather         in prostate & liver (dx 50s/60s?) (+occupat. exp.)    Breast cancer Paternal Grandmother         (dx age? unilat?)    Diabetes Paternal Grandfather     Diabetes type II Paternal Grandfather     Breast cancer Other         (dx age?)    Breast cancer Other         (dx ~50?)    Amblyopia Neg Hx     Blindness Neg Hx     Cataracts Neg Hx     Glaucoma Neg Hx     Hypertension Neg Hx     Macular degeneration Neg Hx     Retinal detachment Neg Hx     Strabismus Neg Hx     Stroke Neg Hx     Thyroid disease Neg Hx        Medications    Current Outpatient Medications:     azelastine-fluticasone (DYMISTA) 137-50 mcg/spray Spry nassal spray, USE 1 SPRAY(S) IN EACH NOSTRIL TWICE DAILY FOR 30 DAYS, Disp: , Rfl:     desloratadine (CLARINEX) 5 mg tablet, TAKE 1 TABLET BY MOUTH ONCE DAILY FOR 30 DAYS, Disp: , Rfl:     meloxicam (MOBIC) 15 MG tablet, Take 1 tablet (15 mg total) by mouth daily as needed for Pain., Disp: 90 tablet, Rfl: 3    montelukast (SINGULAIR) 5 MG chewable tablet, CHEW AND SWALLOW 1 TABLET BY MOUTH ONCE DAILY AS DIRECTED, Disp: , Rfl:     olopatadine (PAZEO) 0.7 % Drop, 1 drop into affected eye, Disp: , Rfl:     spironolactone (ALDACTONE) 50 MG tablet, Take 50 mg by mouth 2 (two) times daily., Disp: , Rfl:     tiZANidine (ZANAFLEX) 2 MG tablet, Take 1-2 tabs PO QHS PRN muscle pain/spasms, Disp: 60 tablet, Rfl: 0    tretinoin (RETIN-A) 0.025 % cream, Apply 1 application topically nightly., Disp: , Rfl:     triamcinolone acetonide 0.1% (KENALOG) 0.1 % ointment, 1 application, Disp: , Rfl:     Allergies  Review of patient's allergies indicates:   Allergen Reactions    Amoxicillin Other (See Comments)     Yeast infections       Review of Systems  General: No fever, chills, or weight loss.  Chest: No chest pain, shortness of breath, or palpitations.  Breast: No pain,  "masses, or nipple discharge.  Vulva: No pain, lesions, or itching.  Vagina: No relaxation, itching, discharge, or lesions.  Abdomen: No pain, nausea, vomiting, diarrhea, or constipation.  Urinary: No incontinence, nocturia, frequency, or dysuria.  Extremities:  No leg cramps, edema, or calf pain.  Neurologic: No headaches, dizziness, or visual changes.    Objective:     Vitals:    02/17/22 1559   BP: 118/63   Weight: 68.3 kg (150 lb 9.2 oz)   Height: 5' 9" (1.753 m)     Body mass index is 22.24 kg/m².    GENERAL:  Well-developed, well-nourished female in no acute distress. Vital signs reviewed.   SKIN:  Warm, dry without rashes, purpura or petechiae.  EYES:  EOMs intact.  Conjunctivae normal.  HEAD:  Normocephalic.  EARS/NOSE/MOUTH/THROAT:  Hearing intact.   NECK:  Supple with good range of motion; no masses  PSYCHIATRIC:  Normal affect and mood.  Alert and Oriented x 3.   BREASTS:Symmetrical, no skin changes or visible lesions.  No palpable masses, nipple discharge bilaterally.    BREAST IMAGING  Mammogram: None  Ultrasound:   MRI:    Assessment:     Increased risk of breast cancer: This is a 26 y.o. female with an increased risk of breast cancer based on Tyrer Cuzick score of 29.2%.  Reviewed NCCN guidelines of breast MRI at age 25 and mammogram at age 30. Pros and cons of breast MRI were reviewed. Since youngest family member was likely diagnosed in their 40s (Maternal Uncle), ok to defer breast MRI for now. Will plan to start mammogram at age 31. If genetic testing is positive, will follow up to discuss starting imaging sooner.     Plan:   Mammograms at age 31.  Defer MRI for now, pending negative genetic testing.  I also recommended two physical exams per year, one can be with her OB/GYN.      Risk reduction options with chemoprevention such as Tamoxifen or Raloxifene were discussed.  Risk reduction therapy in those < 35 years of age is unknown. She is not a candidate for chemoprevention at this " time.    Reviewed lifestyle modifications that have shown benefit of reducing breast cancer risk.   Including: Limit alcohol to less than one drink per day, Exercise at least 150 minutes per week of moderate intensity aerobic activity or at least 75 minutes of vigorous activity, Maintaining a healthy weight, Limit red meat to no more than 2-3x per week, Reduce processed foods and processed meat. Also encouraged wide variety of fruits and vegetables, specifically cruciferous vegetables.    Follow up in 1 year for CBE. Follow up sooner if genetic testing is positive.    I spent a total of 35 minutes on the day of the visit.This includes face to face time and non-face to face time preparing to see the patient (eg, review of tests), obtaining and/or reviewing separately obtained history, documenting clinical information in the electronic or other health record, independently interpreting results and communicating results to the patient/family/caregiver, or care coordinator.

## 2022-02-28 ENCOUNTER — PATIENT MESSAGE (OUTPATIENT)
Dept: HEMATOLOGY/ONCOLOGY | Facility: CLINIC | Age: 27
End: 2022-02-28
Payer: COMMERCIAL

## 2022-03-16 DIAGNOSIS — Z11.59 NEED FOR HEPATITIS C SCREENING TEST: ICD-10-CM

## 2022-03-17 ENCOUNTER — PATIENT MESSAGE (OUTPATIENT)
Dept: HEMATOLOGY/ONCOLOGY | Facility: CLINIC | Age: 27
End: 2022-03-17
Payer: COMMERCIAL

## 2022-03-17 DIAGNOSIS — Z80.3 FAMILY HISTORY OF BREAST CANCER: Primary | ICD-10-CM

## 2022-03-17 DIAGNOSIS — Z80.42 FAMILY HISTORY OF PROSTATE CANCER: ICD-10-CM

## 2022-04-12 ENCOUNTER — TELEPHONE (OUTPATIENT)
Dept: HEMATOLOGY/ONCOLOGY | Facility: CLINIC | Age: 27
End: 2022-04-12
Payer: COMMERCIAL

## 2022-04-12 NOTE — TELEPHONE ENCOUNTER
Called and spoke with Precious, informed her of the new test kit being shipped to her. She voiced thanks and understanding.    ----- Message from Sunny Lei DNP sent at 2022 10:03 AM CDT -----  Anthony Vivas sent me a photo of the saliva tube and the pt's handwritten first name is difficult to read, so we will need her to submit a new saliva specimen and ensure that her name and  are very legible on it.      I have asked Anthony to go ahead and ship the new kit to her.    Can you please let the pt know?      Thank you!  Sunny

## 2022-04-20 ENCOUNTER — TELEPHONE (OUTPATIENT)
Dept: HEMATOLOGY/ONCOLOGY | Facility: CLINIC | Age: 27
End: 2022-04-20
Payer: COMMERCIAL

## 2022-04-20 NOTE — TELEPHONE ENCOUNTER
Called and spoke w/ Precious. She does still have the kit. She plans on completing and sending out the new specimen today.    ----- Message from Sunny Lei DNP sent at 4/19/2022  8:58 AM CDT -----  Danita,    Can you please assist with the below, from [employer]?    Thanks!  Sunny    ___     Brissa Whitman 4/19/2022 8:30 AM  Saleem Correa. Please disregard. It appears the sample failure was from the initial tube. We are still waiting for the sample to come back that was delivered to the patient on Wednesday, 4/13/2022 at 12:22 pm. Per inbound tracking, [id number], it is not on its way back to us yet. Please contact patient and ensure kit was received and any intentions on when it will be sent back. Brissa Hall

## 2022-05-18 ENCOUNTER — DOCUMENTATION ONLY (OUTPATIENT)
Dept: HEMATOLOGY/ONCOLOGY | Facility: CLINIC | Age: 27
End: 2022-05-18
Payer: COMMERCIAL

## 2022-05-18 NOTE — LETTER
May 18, 2022    Precious Perez  [address] [facility] [address]         Dear Precious,    You recently underwent germline cancer genetic testing after meeting with me, Sunny Lei NP, with the [facility].  Below is important information pertaining to your genetic testing results as well as other aspects of your health care.  Please read the letter at your earliest convenience and reach out to me with any questions or concerns.      If you would like to have an in-person or a virtual appointment for post-test genetic counseling, please message me through your [facility] patient portal or call me at [phone] to schedule the appointment.    Cancer Genetic Testing Results    The specific test that you underwent was the [employer] Common Hereditary Cancers Panel through [employer].  In the 47 genes that were tested, no clinically significant mutations or variants of unknown significance were identified; in other words, your test came back negative.    Pattie Pina DO, and Belkis Singh PA-C, will be notified of your genetic testing results.  A copy of your genetic testing results will be in your [facility] medical record.  Your genetic testing results report has been released to you and is accessible by you through your [employer] patient portal; if you have any difficulty accessing this, please let our office know so we can mail you a hard copy.    What Do These Results Mean?    Having negative genetic testing reduces but does not completely eliminate the possibility of a hereditary cancer, in part because genetic testing technology and knowledge of genes' association with cancer are evolving, so in the future updated genetic testing may be recommended for you.    Only a small percentage of cancers are hereditary, meaning due to an inherited gene mutation.  Environmental and lifestyle factors play a significant role in the development of many  cancers.  As such, your genetic testing results also do not necessarily indicate that you are not at increased risk for certain cancers, as you may independently have risk factors for certain cancers and/or you may share risk factors with your family members affected by cancer.  Because of this, it is strongly recommended that you ensure that your healthcare providers are aware of and up-to-date on your personal and family histories so that your medical management including cancer screenings can be based in part off of this information.      Re-requisition    Of note, [employer] offers re-requisition--the addition of further genes within the same clinical area (i.e. oncology, in this case)--at no additional charge and with no need for a new specimen.  If you are interested in pursuing this or have questions regarding this, please contact me directly by messaging me through your [facility] patient portal or calling me at [phone].    Health Maintenance / Cancer Risks and Risk Management    It is recommended that you be established with a primary care provider (PCP) and a gynecology provider (GYN), both of whom you should see at least annually for routine health maintenance.  If you are not established with both of these types of providers, please contact me so I can refer you.    Additionally, based on your personal/family history:  History Recommendation   Personal increased breast cancer risk, and family history of breast cancer Continue care with [facility] (Belkis Singh PA-C) as directed     Suspected family history of colon polyps (maternal uncle Teb) Try to find out whether any of this relative's colon polyps, if he did have any, were characterized as any of the following:  · Adenoma with high-grade dysplasia  · Adenoma 1cm or larger in size  · Villous or tubulovillous adenoma  · Traditional serrated adenoma (TSA)  · Sessile serrated polyp 1cm or larger in size  · Sessile serrated polyp  with any dysplasia    If any of the above apply, it is recommended that you begin colonoscopies for colorectal cancer screening by age 40 or at the age of onset of the abovementioned polyp in your relative--whichever is earlier    Please let me know what you find out so that we can formulate a plan from there     Some information regarding general cancer risk reduction:  It is recommended to avoid tobacco use; be physically active; maintain a healthy weight; eat a diet rich in fruits, vegetables, and whole grains and low in saturated/trans fat, red meat, and processed meat; limit alcohol consumption (zero is best); protect against sexually transmitted infections; protect against the sun, and avoid tanning beds; and get regular screenings for cancers as recommended by your healthcare team.    Regarding Your Relatives    Your relatives also may be at increased risk for certain cancers, depending upon their own personal and family history.    Additionally, it is possible for them to have cancer-related genetic mutations even if/when you have negative genetic testing.      Your relatives should speak with a healthcare provider about their cancer risks and whether genetic counseling/testing may be indicated for them.  Anyone interested in pursuing cancer-genetic counseling/testing may contact the [facility] at [phone] to schedule an appointment or visit [email or web address] to locate a local genetic specialist.    Follow-up    Please continue to follow up with all healthcare providers as directed.    Moving forward, please update me with any changes to your personal or family history and with any relative's genetic testing results.  Barring any such changes, please follow up with me in 3 years for determination as to whether updated genetic testing is indicated for you at that time.    In the meantime, please don't hesitate to reach out with any questions or concerns.    Sincerely,        Sunny Lei, DNP, APRN,  FNP-BC, AOCNP  Nurse Practitioner, Hereditary and High-Risk Clinic  Department of Hematology and Oncology  [facility]    Phone:  [phone]        CC:  Pattie Pina DO, and Belkis Singh PA-C

## 2022-05-18 NOTE — PROGRESS NOTES
"Cancer Genetics  Hereditary and High-Risk Clinic  Department of Hematology and Oncology  [facility]      Germline Testing for Hereditary Cancer Susceptibility  Results Note      Patient Identification  Name: Precious Perez ("Precious")  : 1995  MRN: [id number]    GERMLINE CANCER GENETIC TESTING     Genetic testing laboratory:  [facility]  Test name:  the [employer] Common Hereditary Cancers Panel  Test accession #:  [id number]     Genes analyzed:  APC*, JUDAH*, AXIN2, BARD1, BMPR1A, BRCA1, BRCA2, BRIP1, CDH1, CDK4, CDKN2A (p14ARF), CDKN2A (c71WHM1c), CHEK2, CTNNA1, DICER1*, EPCAM*, GREM1*, HOXB13, KIT, MEN1*, MLH1*, MSH2*, MSH3*, MSH6*, MUTYH, NBN, NF1*, NTHL1, PALB2, PDGFRA, PMS2*, POLD1*, POLE, PTEN*, RAD50, RAD51C, RAD51D, SDHA*, SDHB, SDHC*, SDHD, SMAD4, SMARCA4, STK11, TP53, TSC1*, TSC2, VHL    Results:  NEGATIVE    PLAN      Sent results letter to patient via [facility] ([facility]).   Genetics Navigator to phone Precious with her results and with notification that the above letter has been sent.   Genetics Navigator to ensure that the results report is available in the patient's [facility] record.    REFERENCES      RAFFAELE Haider (). Overview of cancer prevention. In DEBORAH Haley, & MATTHIAS Tuttle (Eds.), UpToDate.   National Comprehensive Cancer Network (NCCN). (). Colorectal cancer screening. NCCN Clinical Practice Guidelines in Oncology (NCCN Guidelines), Version 1..    Signed,    Sunny Lei, DNP, APRN, FNP-BC, AOCNP  Hereditary and High-Risk Clinic  Department of Hematology and Oncology  [facility]            CC:  Pattie Pina DO, and Belkis Singh PA-C      "

## 2022-05-18 NOTE — Clinical Note
Danita,  Please phone patient with the following: --Inform patient of genetic testing results as detailed in attached note. --Inform patient that a results letter with recommendations has been sent to patient via [facility]. --Make patient aware of referral(s) placed to N/A.  2.   Please document your call, and CC referring provider and me.  3.   Please ensure that the complete results report is scanned into [facility].  Thank you, Sunny

## 2022-05-20 ENCOUNTER — TELEPHONE (OUTPATIENT)
Dept: HEMATOLOGY/ONCOLOGY | Facility: CLINIC | Age: 27
End: 2022-05-20
Payer: COMMERCIAL

## 2022-05-20 NOTE — TELEPHONE ENCOUNTER
Called and reviewed the results and summary letter with Precious. She voiced thanks and understanding. She has no questions at this time. A copy of the report and letter will be mailed out to her.    ----- Message from Sunny Lei DNP sent at 5/18/2022  5:16 PM CDT -----    Danita,    Please phone patient with the following:  --Inform patient of genetic testing results as detailed in attached note.  --Inform patient that a results letter with recommendations has been sent to patient via [facility].  --Make patient aware of referral(s) placed to N/A.    2.   Please document your call, and CC referring provider and me.    3.   Please ensure that the complete results report is scanned into [facility].    Thank you,  Sunny

## 2022-07-08 ENCOUNTER — OFFICE VISIT (OUTPATIENT)
Dept: DERMATOLOGY | Facility: CLINIC | Age: 27
End: 2022-07-08
Payer: COMMERCIAL

## 2022-07-08 DIAGNOSIS — L70.0 ACNE VULGARIS: Primary | ICD-10-CM

## 2022-07-08 DIAGNOSIS — Z51.81 MEDICATION MONITORING ENCOUNTER: ICD-10-CM

## 2022-07-08 PROCEDURE — 1160F RVW MEDS BY RX/DR IN RCRD: CPT | Mod: CPTII,95,, | Performed by: DERMATOLOGY

## 2022-07-08 PROCEDURE — 1159F PR MEDICATION LIST DOCUMENTED IN MEDICAL RECORD: ICD-10-PCS | Mod: CPTII,95,, | Performed by: DERMATOLOGY

## 2022-07-08 PROCEDURE — 1160F PR REVIEW ALL MEDS BY PRESCRIBER/CLIN PHARMACIST DOCUMENTED: ICD-10-PCS | Mod: CPTII,95,, | Performed by: DERMATOLOGY

## 2022-07-08 PROCEDURE — 1159F MED LIST DOCD IN RCRD: CPT | Mod: CPTII,95,, | Performed by: DERMATOLOGY

## 2022-07-08 PROCEDURE — 99214 PR OFFICE/OUTPT VISIT, EST, LEVL IV, 30-39 MIN: ICD-10-PCS | Mod: 95,,, | Performed by: DERMATOLOGY

## 2022-07-08 PROCEDURE — 99214 OFFICE O/P EST MOD 30 MIN: CPT | Mod: 95,,, | Performed by: DERMATOLOGY

## 2022-07-08 RX ORDER — CLASCOTERONE 1 G/100G
CREAM TOPICAL
Qty: 60 G | Refills: 5 | Status: SHIPPED | OUTPATIENT
Start: 2022-07-08 | End: 2023-03-03

## 2022-07-08 RX ORDER — SPIRONOLACTONE 25 MG/1
TABLET ORAL
Qty: 60 TABLET | Refills: 1 | Status: SHIPPED | OUTPATIENT
Start: 2022-07-08 | End: 2023-03-03

## 2022-07-08 RX ORDER — SULFACETAMIDE SODIUM, SULFUR 100; 50 MG/G; MG/G
EMULSION TOPICAL
Qty: 360 G | Refills: 3 | Status: SHIPPED | OUTPATIENT
Start: 2022-07-08

## 2022-07-08 RX ORDER — SPIRONOLACTONE 50 MG/1
TABLET, FILM COATED ORAL
Qty: 60 TABLET | Refills: 1 | Status: SHIPPED | OUTPATIENT
Start: 2022-07-08 | End: 2023-07-27

## 2022-07-08 NOTE — PROGRESS NOTES
Subjective:       Patient ID:  Precious Perez is a 26 y.o. female who presents for No chief complaint on file.    The patient location is: [place]  The chief complaint leading to consultation is: cystic acne bump    Visit type: audiovisual    Face to Face time with patient: 15 min  18 minutes of total time spent on the encounter, which includes face to face time and non-face to face time preparing to see the patient (eg, review of tests), Obtaining and/or reviewing separately obtained history, Documenting clinical information in the electronic or other health record, Independently interpreting results (not separately reported) and communicating results to the patient/family/caregiver, or Care coordination (not separately reported).         Each patient to whom he or she provides medical services by telemedicine is:  (1) informed of the relationship between the physician and patient and the respective role of any other health care provider with respect to management of the patient; and (2) notified that he or she may decline to receive medical services by telemedicine and may withdraw from such care at any time.    Notes:   History of Present Illness: The patient presents with chief complaint of cystic acne bumps.  Location: chin  Duration: current bumps x 2 weeks; intermittently flaring every couple of weeks; has had acne for years  Signs/Symptoms: deep tender under the skin cystic bumps    Prior treatments:   Tretinoin 0.025% cream - tolerating nightly  Spironolactone - 50 mg BID x at least 6 months - worked well at first but then started flaring again    Notes she has tried multiple topicals, doxycycline  Has not had accutane, not interested in it  Is training for a triathlon, tolerating spironolactone, staying well hydrated.  She denies breast tenderness, SOB, fatigue, muscle cramping, palpitations, syncopal or pre-syncopal episodes.     Stopped birth control in October  Not sexually active with men  Denies  personal or 1st degree fam member with h/o breast/ovarian/uterine cancer  Denies any issues with kidney dysfunction          Review of Systems   Cardiovascular: Negative for palpitations.   Genitourinary: Positive for irregular periods.   Musculoskeletal: Negative for myalgias.   Neurological: Negative for lightheadedness with standing.        Objective:    Physical Exam   Constitutional: She appears well-developed and well-nourished. No distress.   Neurological: She is alert and oriented to person, place, and time. She is not disoriented.   Psychiatric: She has a normal mood and affect.   Skin:   Areas Examined (abnormalities noted in diagram):   Head / Face Inspection Performed              Diagram Legend     Erythematous scaling macule/papule c/w actinic keratosis       Vascular papule c/w angioma      Pigmented verrucoid papule/plaque c/w seborrheic keratosis      Yellow umbilicated papule c/w sebaceous hyperplasia      Irregularly shaped tan macule c/w lentigo     1-2 mm smooth white papules consistent with Milia      Movable subcutaneous cyst with punctum c/w epidermal inclusion cyst      Subcutaneous movable cyst c/w pilar cyst      Firm pink to brown papule c/w dermatofibroma      Pedunculated fleshy papule(s) c/w skin tag(s)      Evenly pigmented macule c/w junctional nevus     Mildly variegated pigmented, slightly irregular-bordered macule c/w mildly atypical nevus      Flesh colored to evenly pigmented papule c/w intradermal nevus       Pink pearly papule/plaque c/w basal cell carcinoma      Erythematous hyperkeratotic cursted plaque c/w SCC      Surgical scar with no sign of skin cancer recurrence      Open and closed comedones      Inflammatory papules and pustules      Verrucoid papule consistent consistent with wart     Erythematous eczematous patches and plaques     Dystrophic onycholytic nail with subungual debris c/w onychomycosis     Umbilicated papule    Erythematous-base heme-crusted tan  verrucoid plaque consistent with inflamed seborrheic keratosis     Erythematous Silvery Scaling Plaque c/w Psoriasis     See annotation              Assessment / Plan:        Acne vulgaris  -discussed etiology and nature of condition, management options  - increase spironolactone to 125 mg daily x 1-2 weeks, and if tolerating well, increase to 150 mg daily  - continue tretinoin 0.025% cream qhs (declines refill), add sulfur wash, and try Winlevi if it is covered  -     spironolactone (ALDACTONE) 25 MG tablet; Take one 50 mg tablet and one 25 mg tablet twice daily, for a total of 150 mg daily  Dispense: 60 tablet; Refill: 1  -     spironolactone (ALDACTONE) 50 MG tablet; Take one 50 mg tablet and one 25 mg tablet twice daily, for a total of 150 mg daily  Dispense: 60 tablet; Refill: 1  -     sulfacetamide sodium-sulfur 10-5 % (w/w) Clsr; Wash face qday - bid  Dispense: 360 g; Refill: 3  -     clascoterone (WINLEVI) 1 % Crea; Apply to face twice daily  Dispense: 60 g; Refill: 5    - spironolactone: Discussed benefits and risks of spironolactone therapy including but not limited to breakthrough bleeding, breast tenderness, and elevated potassium levels which may give symptoms of fatigue, palpitations, and nausea. Patient should limit potassium intake - avoid potassium supplements or salt substitutes, limit bananas and citrus fruits. Pregnancy must be avoided while taking spironolactone.  Hold medication if acutely ill or if taking Bactrim. Discussed theoretical increased risk of hormone related cancers such as breast, ovarian and uterine cancer.  Patient acknowledged understanding of these risks.    - topical retinoid: we reviewed that a pea sized amount of the topical retinoid is to be applied to the entire face at night and that it is not spot treatment. Patient to use every other night or 3 nights a week and gradually increase to goal of nightly use (as tolerated). Side effects reviewed to include but not limited to  redness, dryness, flaking, burning/tingling sensation, skin irritation, and feeling of tightness. We reviewed that this is not to be used if pregnant.  Recommended discontinuing use for one week prior to waxing.    Medication monitoring encounter  - check BMP after 6 weeks of increased alec dose (order placed)         RTC 3 months

## 2022-07-11 ENCOUNTER — PATIENT MESSAGE (OUTPATIENT)
Dept: HEMATOLOGY/ONCOLOGY | Facility: CLINIC | Age: 27
End: 2022-07-11
Payer: COMMERCIAL

## 2022-07-25 ENCOUNTER — PATIENT MESSAGE (OUTPATIENT)
Dept: HEMATOLOGY/ONCOLOGY | Facility: CLINIC | Age: 27
End: 2022-07-25
Payer: COMMERCIAL

## 2022-08-29 ENCOUNTER — PATIENT MESSAGE (OUTPATIENT)
Dept: DERMATOLOGY | Facility: CLINIC | Age: 27
End: 2022-08-29
Payer: COMMERCIAL

## 2022-09-12 ENCOUNTER — LAB VISIT (OUTPATIENT)
Dept: LAB | Facility: OTHER | Age: 27
End: 2022-09-12
Attending: FAMILY MEDICINE
Payer: COMMERCIAL

## 2022-09-12 DIAGNOSIS — Z51.81 MEDICATION MONITORING ENCOUNTER: ICD-10-CM

## 2022-09-12 DIAGNOSIS — Z11.59 NEED FOR HEPATITIS C SCREENING TEST: ICD-10-CM

## 2022-09-12 LAB
ANION GAP SERPL CALC-SCNC: 9 MMOL/L (ref 8–16)
BUN SERPL-MCNC: 11 MG/DL (ref 6–20)
CALCIUM SERPL-MCNC: 9.8 MG/DL (ref 8.7–10.5)
CHLORIDE SERPL-SCNC: 104 MMOL/L (ref 95–110)
CO2 SERPL-SCNC: 26 MMOL/L (ref 23–29)
CREAT SERPL-MCNC: 0.8 MG/DL (ref 0.5–1.4)
EST. GFR  (NO RACE VARIABLE): >60 ML/MIN/1.73 M^2
GLUCOSE SERPL-MCNC: 103 MG/DL (ref 70–110)
HCV AB SERPL QL IA: NORMAL
POTASSIUM SERPL-SCNC: 5.4 MMOL/L (ref 3.5–5.1)
SODIUM SERPL-SCNC: 139 MMOL/L (ref 136–145)

## 2022-09-12 PROCEDURE — 80048 BASIC METABOLIC PNL TOTAL CA: CPT | Performed by: DERMATOLOGY

## 2022-09-12 PROCEDURE — 36415 COLL VENOUS BLD VENIPUNCTURE: CPT | Performed by: FAMILY MEDICINE

## 2022-09-12 PROCEDURE — 86803 HEPATITIS C AB TEST: CPT | Performed by: FAMILY MEDICINE

## 2022-09-13 DIAGNOSIS — E87.5 HYPERKALEMIA: ICD-10-CM

## 2022-09-13 DIAGNOSIS — Z51.81 MEDICATION MONITORING ENCOUNTER: Primary | ICD-10-CM

## 2022-09-13 NOTE — PROGRESS NOTES
Noted elevated K+ (5.4). Called and spoke with patient, not having any muscle weakness/cramps/palpitations/lightheadedness/pre syncope/syncope.  Notes she just completed a triatholon last week.  Recommended stopping spironolactone and repeating BMP ASAP.  Also recommended avoiding the additional electrolyte/hydration tablets/liquid IV and potassium rich foods. If she develops concerning symptoms she is aware to go to ER if concerning symptoms develop.

## 2022-09-15 ENCOUNTER — LAB VISIT (OUTPATIENT)
Dept: LAB | Facility: OTHER | Age: 27
End: 2022-09-15
Attending: FAMILY MEDICINE
Payer: COMMERCIAL

## 2022-09-15 DIAGNOSIS — E87.5 HYPERKALEMIA: ICD-10-CM

## 2022-09-15 DIAGNOSIS — Z51.81 MEDICATION MONITORING ENCOUNTER: ICD-10-CM

## 2022-09-15 LAB
ANION GAP SERPL CALC-SCNC: 9 MMOL/L (ref 8–16)
BUN SERPL-MCNC: 16 MG/DL (ref 6–20)
CALCIUM SERPL-MCNC: 9.5 MG/DL (ref 8.7–10.5)
CHLORIDE SERPL-SCNC: 104 MMOL/L (ref 95–110)
CO2 SERPL-SCNC: 27 MMOL/L (ref 23–29)
CREAT SERPL-MCNC: 0.7 MG/DL (ref 0.5–1.4)
EST. GFR  (NO RACE VARIABLE): >60 ML/MIN/1.73 M^2
GLUCOSE SERPL-MCNC: 91 MG/DL (ref 70–110)
POTASSIUM SERPL-SCNC: 4.7 MMOL/L (ref 3.5–5.1)
SODIUM SERPL-SCNC: 140 MMOL/L (ref 136–145)

## 2022-09-15 PROCEDURE — 80048 BASIC METABOLIC PNL TOTAL CA: CPT | Performed by: DERMATOLOGY

## 2022-09-15 PROCEDURE — 36415 COLL VENOUS BLD VENIPUNCTURE: CPT | Performed by: DERMATOLOGY

## 2022-09-16 DIAGNOSIS — Z51.81 MEDICATION MONITORING ENCOUNTER: Primary | ICD-10-CM

## 2022-09-16 NOTE — PROGRESS NOTES
Informed pt via portal: Hi, your potassium level is back to normal (4.7).  It is ok to resume the spironolactone at your regular dose (150 mg daily) and lets recheck your potassium again in 2-3 weeks. I recommend holding the spironolactone if you do any more triathlons or use a lot of the liquid IV/hydration tablets/additional electrolyte supplementation.

## 2022-12-09 ENCOUNTER — OFFICE VISIT (OUTPATIENT)
Dept: OBSTETRICS AND GYNECOLOGY | Facility: CLINIC | Age: 27
End: 2022-12-09
Payer: COMMERCIAL

## 2022-12-09 VITALS
BODY MASS INDEX: 24.2 KG/M2 | WEIGHT: 163.38 LBS | DIASTOLIC BLOOD PRESSURE: 78 MMHG | SYSTOLIC BLOOD PRESSURE: 114 MMHG | HEIGHT: 69 IN

## 2022-12-09 DIAGNOSIS — Z01.419 ENCOUNTER FOR ANNUAL ROUTINE GYNECOLOGICAL EXAMINATION: Primary | ICD-10-CM

## 2022-12-09 DIAGNOSIS — Z91.89 AT HIGH RISK FOR BREAST CANCER: ICD-10-CM

## 2022-12-09 PROCEDURE — 1160F RVW MEDS BY RX/DR IN RCRD: CPT | Mod: CPTII,S$GLB,, | Performed by: OBSTETRICS & GYNECOLOGY

## 2022-12-09 PROCEDURE — 99395 PR PREVENTIVE VISIT,EST,18-39: ICD-10-PCS | Mod: S$GLB,,, | Performed by: OBSTETRICS & GYNECOLOGY

## 2022-12-09 PROCEDURE — 3008F BODY MASS INDEX DOCD: CPT | Mod: CPTII,S$GLB,, | Performed by: OBSTETRICS & GYNECOLOGY

## 2022-12-09 PROCEDURE — 3008F PR BODY MASS INDEX (BMI) DOCUMENTED: ICD-10-PCS | Mod: CPTII,S$GLB,, | Performed by: OBSTETRICS & GYNECOLOGY

## 2022-12-09 PROCEDURE — 1160F PR REVIEW ALL MEDS BY PRESCRIBER/CLIN PHARMACIST DOCUMENTED: ICD-10-PCS | Mod: CPTII,S$GLB,, | Performed by: OBSTETRICS & GYNECOLOGY

## 2022-12-09 PROCEDURE — 1159F PR MEDICATION LIST DOCUMENTED IN MEDICAL RECORD: ICD-10-PCS | Mod: CPTII,S$GLB,, | Performed by: OBSTETRICS & GYNECOLOGY

## 2022-12-09 PROCEDURE — 99999 PR PBB SHADOW E&M-EST. PATIENT-LVL III: CPT | Mod: PBBFAC,,, | Performed by: OBSTETRICS & GYNECOLOGY

## 2022-12-09 PROCEDURE — 3078F PR MOST RECENT DIASTOLIC BLOOD PRESSURE < 80 MM HG: ICD-10-PCS | Mod: CPTII,S$GLB,, | Performed by: OBSTETRICS & GYNECOLOGY

## 2022-12-09 PROCEDURE — 1159F MED LIST DOCD IN RCRD: CPT | Mod: CPTII,S$GLB,, | Performed by: OBSTETRICS & GYNECOLOGY

## 2022-12-09 PROCEDURE — 99395 PREV VISIT EST AGE 18-39: CPT | Mod: S$GLB,,, | Performed by: OBSTETRICS & GYNECOLOGY

## 2022-12-09 PROCEDURE — 3078F DIAST BP <80 MM HG: CPT | Mod: CPTII,S$GLB,, | Performed by: OBSTETRICS & GYNECOLOGY

## 2022-12-09 PROCEDURE — 99999 PR PBB SHADOW E&M-EST. PATIENT-LVL III: ICD-10-PCS | Mod: PBBFAC,,, | Performed by: OBSTETRICS & GYNECOLOGY

## 2022-12-09 PROCEDURE — 3074F SYST BP LT 130 MM HG: CPT | Mod: CPTII,S$GLB,, | Performed by: OBSTETRICS & GYNECOLOGY

## 2022-12-09 PROCEDURE — 3074F PR MOST RECENT SYSTOLIC BLOOD PRESSURE < 130 MM HG: ICD-10-PCS | Mod: CPTII,S$GLB,, | Performed by: OBSTETRICS & GYNECOLOGY

## 2022-12-12 NOTE — PROGRESS NOTES
SUBJECTIVE:     Chief Complaint: Well Woman       History of Present Illness:  Annual Exam  Patient presents for annual exam.   She c/o nothing today.  LMP: 12/5/22   She denies any vd, vb, dyspareunia, dysuria, depression, anxiety.  Last pap was in 2021 and was neg. HPV na.  Birth Control: none, declines  declines STD testing.   Saw breast clinic and BRCA was negative, needs mammograms starting age 30yo.    GYN screening history: denies  Mammogram history: na  Colonoscopy history: na  Dexa history: na     FH:   Breast cancer: maternal and paternal GM  Colon cancer: maternal GF and uncle  Ovarian cancer: maternal and paternal GM  Pt plans to get genetic testing soon due to FH.     Review of patient's allergies indicates:   Allergen Reactions    Amoxicillin Other (See Comments)     Yeast infections       Past Medical History:   Diagnosis Date    Acne     Back injury     Environmental allergies      Past Surgical History:   Procedure Laterality Date    WISDOM TOOTH EXTRACTION       OB History    No obstetric history on file.       Family History   Problem Relation Age of Onset    Diabetes type II Father     ADD / ADHD Other     Prostate cancer Maternal Uncle         dx 50s, metastasized    Other Maternal Uncle         suspected colon polyps    Breast cancer Maternal Grandmother         dx 50s (uni/bilat?), thinks recurred in (same?) breast and distantly    Cancer Maternal Grandfather         in prostate & liver (dx 50s/60s?) (+occupat. exp.)    Breast cancer Paternal Grandmother         (dx age? unilat?)    Diabetes Paternal Grandfather     Diabetes type II Paternal Grandfather     Breast cancer Other         (dx age?)    Breast cancer Other         (dx ~50?)    Amblyopia Neg Hx     Blindness Neg Hx     Cataracts Neg Hx     Glaucoma Neg Hx     Hypertension Neg Hx     Macular degeneration Neg Hx     Retinal detachment Neg Hx     Strabismus Neg Hx     Stroke Neg Hx     Thyroid disease Neg Hx      Social History      Tobacco Use    Smoking status: Never    Smokeless tobacco: Never       Current Outpatient Medications   Medication Sig    azelastine-fluticasone (DYMISTA) 137-50 mcg/spray Spry nassal spray USE 1 SPRAY(S) IN EACH NOSTRIL TWICE DAILY FOR 30 DAYS    clascoterone (WINLEVI) 1 % Crea Apply to face twice daily    desloratadine (CLARINEX) 5 mg tablet TAKE 1 TABLET BY MOUTH ONCE DAILY FOR 30 DAYS    meloxicam (MOBIC) 15 MG tablet Take 1 tablet (15 mg total) by mouth daily as needed for Pain.    montelukast (SINGULAIR) 5 MG chewable tablet CHEW AND SWALLOW 1 TABLET BY MOUTH ONCE DAILY AS DIRECTED    olopatadine (PAZEO) 0.7 % Drop 1 drop into affected eye    spironolactone (ALDACTONE) 25 MG tablet Take one 50 mg tablet and one 25 mg tablet twice daily, for a total of 150 mg daily    spironolactone (ALDACTONE) 50 MG tablet Take one 50 mg tablet and one 25 mg tablet twice daily, for a total of 150 mg daily    sulfacetamide sodium-sulfur 10-5 % (w/w) Clsr Wash face qday - bid    tiZANidine (ZANAFLEX) 2 MG tablet Take 1-2 tabs PO QHS PRN muscle pain/spasms    tretinoin (RETIN-A) 0.025 % cream Apply 1 application topically nightly.    triamcinolone acetonide 0.1% (KENALOG) 0.1 % ointment 1 application     No current facility-administered medications for this visit.       Review of Systems:  GENERAL: No fever, chills, fatigability or weight loss.  CARDIOVASCULAR: No chest pain. No palpitations.  RESPIRATORY: No SOB, no wheezing.  BREAST: Denies pain. No lumps. No discharge.  VULVAR: No pain, no lesions and no itching.  VAGINAL: No relaxation, no itching, no discharge, no abnormal bleeding and no lesions.  ABDOMEN: No abdominal pain. Denies nausea. Denies vomiting. No diarrhea. No constipation  URINARY: No incontinence, no nocturia, no frequency and no dysuria.  NEUROLOGICAL: No headaches. No vision changes.       OBJECTIVE:     Vitals:    12/09/22 1558   BP: 114/78       Patient verbally consents to pelvic and breast  exams.  Physical Exam:  Gen: NAD, well developed, well-nourished  HEENT: Normocephalic, atraumatic  Eyes: EOM nl, conjuntivae normal  Neck: ROM normal, no thyromegaly  Respiratory: Effort normal   Abd: soft, nontender, no masses palpated  Breast: Normal bilaterally, no masses, lesions or tenderness. No nipple discharge on expression, no lymphadenopathy bilaterally.  SSE:  Vulva: no lesions or rashes  Cervix: No lesions noted, nonfriable, no vaginal discharge or vaginal bleeding noted  BME:   Cervix: No CMT  Adnexa: nl bilaterally, no masses or fullness palpated  Uterus: normal, nonenlarged  Musculoskeletal: normal ROM  Neuro: alert, AAOx3  Skin: warm and dry  Psych: mood/affect nl, behavior normal, judgement normal, thought content normal        ASSESSMENT:       ICD-10-CM ICD-9-CM    1. Encounter for annual routine gynecological examination  Z01.419 V72.31       2. At high risk for breast cancer  Z91.89 V49.89              Plan:      Precious was seen today for well woman.    Diagnoses and all orders for this visit:    Encounter for annual routine gynecological examination    At high risk for breast cancer        No orders of the defined types were placed in this encounter.      Follow up in one year for annual, or prn.    Julie R Jeansonne

## 2023-02-17 DIAGNOSIS — Z00.00 ANNUAL PHYSICAL EXAM: Primary | ICD-10-CM

## 2023-03-03 ENCOUNTER — OFFICE VISIT (OUTPATIENT)
Dept: INTERNAL MEDICINE | Facility: CLINIC | Age: 28
End: 2023-03-03
Payer: COMMERCIAL

## 2023-03-03 ENCOUNTER — CLINICAL SUPPORT (OUTPATIENT)
Dept: INTERNAL MEDICINE | Facility: CLINIC | Age: 28
End: 2023-03-03
Payer: COMMERCIAL

## 2023-03-03 ENCOUNTER — HOSPITAL ENCOUNTER (OUTPATIENT)
Dept: RADIOLOGY | Facility: HOSPITAL | Age: 28
Discharge: HOME | End: 2023-03-03
Attending: STUDENT IN AN ORGANIZED HEALTH CARE EDUCATION/TRAINING PROGRAM
Payer: COMMERCIAL

## 2023-03-03 ENCOUNTER — PATIENT MESSAGE (OUTPATIENT)
Dept: OBSTETRICS AND GYNECOLOGY | Facility: CLINIC | Age: 28
End: 2023-03-03
Payer: COMMERCIAL

## 2023-03-03 ENCOUNTER — HOSPITAL ENCOUNTER (OUTPATIENT)
Dept: CARDIOLOGY | Facility: CLINIC | Age: 28
Discharge: HOME | End: 2023-03-03
Payer: COMMERCIAL

## 2023-03-03 VITALS
WEIGHT: 164.88 LBS | SYSTOLIC BLOOD PRESSURE: 110 MMHG | BODY MASS INDEX: 24.42 KG/M2 | HEIGHT: 69 IN | HEART RATE: 76 BPM | DIASTOLIC BLOOD PRESSURE: 67 MMHG

## 2023-03-03 DIAGNOSIS — Z00.00 HEALTHCARE MAINTENANCE: ICD-10-CM

## 2023-03-03 DIAGNOSIS — Z00.00 ROUTINE GENERAL MEDICAL EXAMINATION AT A HEALTH CARE FACILITY: Primary | ICD-10-CM

## 2023-03-03 DIAGNOSIS — Z00.00 ANNUAL PHYSICAL EXAM: ICD-10-CM

## 2023-03-03 LAB
ALBUMIN SERPL BCP-MCNC: 4 G/DL (ref 3.5–5.2)
ALP SERPL-CCNC: 66 U/L (ref 55–135)
ALT SERPL W/O P-5'-P-CCNC: 42 U/L (ref 10–44)
ANION GAP SERPL CALC-SCNC: 6 MMOL/L (ref 8–16)
AST SERPL-CCNC: 21 U/L (ref 10–40)
BILIRUB SERPL-MCNC: 0.8 MG/DL (ref 0.1–1)
BUN SERPL-MCNC: 15 MG/DL (ref 6–20)
CALCIUM SERPL-MCNC: 9.4 MG/DL (ref 8.7–10.5)
CHLORIDE SERPL-SCNC: 106 MMOL/L (ref 95–110)
CHOLEST SERPL-MCNC: 152 MG/DL (ref 120–199)
CHOLEST/HDLC SERPL: 2.7 {RATIO} (ref 2–5)
CO2 SERPL-SCNC: 27 MMOL/L (ref 23–29)
CREAT SERPL-MCNC: 0.8 MG/DL (ref 0.5–1.4)
ERYTHROCYTE [DISTWIDTH] IN BLOOD BY AUTOMATED COUNT: 12.2 % (ref 11.5–14.5)
EST. GFR  (NO RACE VARIABLE): >60 ML/MIN/1.73 M^2
ESTIMATED AVG GLUCOSE: 94 MG/DL (ref 68–131)
GLUCOSE SERPL-MCNC: 93 MG/DL (ref 70–110)
HBA1C MFR BLD: 4.9 % (ref 4–5.6)
HCT VFR BLD AUTO: 42.5 % (ref 37–48.5)
HDLC SERPL-MCNC: 56 MG/DL (ref 40–75)
HDLC SERPL: 36.8 % (ref 20–50)
HGB BLD-MCNC: 13.8 G/DL (ref 12–16)
LDLC SERPL CALC-MCNC: 80 MG/DL (ref 63–159)
MCH RBC QN AUTO: 28.2 PG (ref 27–31)
MCHC RBC AUTO-ENTMCNC: 32.5 G/DL (ref 32–36)
MCV RBC AUTO: 87 FL (ref 82–98)
NONHDLC SERPL-MCNC: 96 MG/DL
PLATELET # BLD AUTO: 316 K/UL (ref 150–450)
PMV BLD AUTO: 10 FL (ref 9.2–12.9)
POTASSIUM SERPL-SCNC: 4.4 MMOL/L (ref 3.5–5.1)
PROT SERPL-MCNC: 7.3 G/DL (ref 6–8.4)
RBC # BLD AUTO: 4.9 M/UL (ref 4–5.4)
SODIUM SERPL-SCNC: 139 MMOL/L (ref 136–145)
TRIGL SERPL-MCNC: 80 MG/DL (ref 30–150)
TSH SERPL DL<=0.005 MIU/L-ACNC: 1.92 UIU/ML (ref 0.4–4)
WBC # BLD AUTO: 8.44 K/UL (ref 3.9–12.7)

## 2023-03-03 PROCEDURE — 3044F HG A1C LEVEL LT 7.0%: CPT | Mod: CPTII,S$GLB,, | Performed by: STUDENT IN AN ORGANIZED HEALTH CARE EDUCATION/TRAINING PROGRAM

## 2023-03-03 PROCEDURE — 36415 COLL VENOUS BLD VENIPUNCTURE: CPT | Performed by: STUDENT IN AN ORGANIZED HEALTH CARE EDUCATION/TRAINING PROGRAM

## 2023-03-03 PROCEDURE — 80053 COMPREHEN METABOLIC PANEL: CPT | Performed by: STUDENT IN AN ORGANIZED HEALTH CARE EDUCATION/TRAINING PROGRAM

## 2023-03-03 PROCEDURE — 3078F PR MOST RECENT DIASTOLIC BLOOD PRESSURE < 80 MM HG: ICD-10-PCS | Mod: CPTII,S$GLB,, | Performed by: STUDENT IN AN ORGANIZED HEALTH CARE EDUCATION/TRAINING PROGRAM

## 2023-03-03 PROCEDURE — 3074F SYST BP LT 130 MM HG: CPT | Mod: CPTII,S$GLB,, | Performed by: STUDENT IN AN ORGANIZED HEALTH CARE EDUCATION/TRAINING PROGRAM

## 2023-03-03 PROCEDURE — 99385 PREV VISIT NEW AGE 18-39: CPT | Mod: S$GLB,,, | Performed by: STUDENT IN AN ORGANIZED HEALTH CARE EDUCATION/TRAINING PROGRAM

## 2023-03-03 PROCEDURE — 99999 PR PBB SHADOW E&M-EST. PATIENT-LVL I: CPT | Mod: PBBFAC,,,

## 2023-03-03 PROCEDURE — 3008F PR BODY MASS INDEX (BMI) DOCUMENTED: ICD-10-PCS | Mod: CPTII,S$GLB,, | Performed by: STUDENT IN AN ORGANIZED HEALTH CARE EDUCATION/TRAINING PROGRAM

## 2023-03-03 PROCEDURE — 97802 MEDICAL NUTRITION INDIV IN: CPT | Mod: S$GLB,,,

## 2023-03-03 PROCEDURE — 99385 PR PREVENTIVE VISIT,NEW,18-39: ICD-10-PCS | Mod: S$GLB,,, | Performed by: STUDENT IN AN ORGANIZED HEALTH CARE EDUCATION/TRAINING PROGRAM

## 2023-03-03 PROCEDURE — 99999 PR PBB SHADOW E&M-EST. PATIENT-LVL IV: ICD-10-PCS | Mod: PBBFAC,,, | Performed by: STUDENT IN AN ORGANIZED HEALTH CARE EDUCATION/TRAINING PROGRAM

## 2023-03-03 PROCEDURE — 71046 X-RAY EXAM CHEST 2 VIEWS: CPT | Mod: TC,FY

## 2023-03-03 PROCEDURE — 84443 ASSAY THYROID STIM HORMONE: CPT | Performed by: STUDENT IN AN ORGANIZED HEALTH CARE EDUCATION/TRAINING PROGRAM

## 2023-03-03 PROCEDURE — 3008F BODY MASS INDEX DOCD: CPT | Mod: CPTII,S$GLB,, | Performed by: STUDENT IN AN ORGANIZED HEALTH CARE EDUCATION/TRAINING PROGRAM

## 2023-03-03 PROCEDURE — 93005 EKG 12-LEAD: ICD-10-PCS | Mod: S$GLB,,, | Performed by: STUDENT IN AN ORGANIZED HEALTH CARE EDUCATION/TRAINING PROGRAM

## 2023-03-03 PROCEDURE — 99999 PR PBB SHADOW E&M-EST. PATIENT-LVL I: ICD-10-PCS | Mod: PBBFAC,,,

## 2023-03-03 PROCEDURE — 80061 LIPID PANEL: CPT | Performed by: STUDENT IN AN ORGANIZED HEALTH CARE EDUCATION/TRAINING PROGRAM

## 2023-03-03 PROCEDURE — 97802 PR MED NUTR THER, 1ST, INDIV, EA 15 MIN: ICD-10-PCS | Mod: S$GLB,,,

## 2023-03-03 PROCEDURE — 3078F DIAST BP <80 MM HG: CPT | Mod: CPTII,S$GLB,, | Performed by: STUDENT IN AN ORGANIZED HEALTH CARE EDUCATION/TRAINING PROGRAM

## 2023-03-03 PROCEDURE — 93010 ELECTROCARDIOGRAM REPORT: CPT | Mod: S$GLB,,, | Performed by: INTERNAL MEDICINE

## 2023-03-03 PROCEDURE — 3074F PR MOST RECENT SYSTOLIC BLOOD PRESSURE < 130 MM HG: ICD-10-PCS | Mod: CPTII,S$GLB,, | Performed by: STUDENT IN AN ORGANIZED HEALTH CARE EDUCATION/TRAINING PROGRAM

## 2023-03-03 PROCEDURE — 71046 XR CHEST PA AND LATERAL: ICD-10-PCS | Mod: 26,,, | Performed by: RADIOLOGY

## 2023-03-03 PROCEDURE — 83036 HEMOGLOBIN GLYCOSYLATED A1C: CPT | Performed by: STUDENT IN AN ORGANIZED HEALTH CARE EDUCATION/TRAINING PROGRAM

## 2023-03-03 PROCEDURE — 1160F RVW MEDS BY RX/DR IN RCRD: CPT | Mod: CPTII,S$GLB,, | Performed by: STUDENT IN AN ORGANIZED HEALTH CARE EDUCATION/TRAINING PROGRAM

## 2023-03-03 PROCEDURE — 1159F PR MEDICATION LIST DOCUMENTED IN MEDICAL RECORD: ICD-10-PCS | Mod: CPTII,S$GLB,, | Performed by: STUDENT IN AN ORGANIZED HEALTH CARE EDUCATION/TRAINING PROGRAM

## 2023-03-03 PROCEDURE — 93005 ELECTROCARDIOGRAM TRACING: CPT | Mod: S$GLB,,, | Performed by: STUDENT IN AN ORGANIZED HEALTH CARE EDUCATION/TRAINING PROGRAM

## 2023-03-03 PROCEDURE — 1159F MED LIST DOCD IN RCRD: CPT | Mod: CPTII,S$GLB,, | Performed by: STUDENT IN AN ORGANIZED HEALTH CARE EDUCATION/TRAINING PROGRAM

## 2023-03-03 PROCEDURE — 71046 X-RAY EXAM CHEST 2 VIEWS: CPT | Mod: 26,,, | Performed by: RADIOLOGY

## 2023-03-03 PROCEDURE — 3044F PR MOST RECENT HEMOGLOBIN A1C LEVEL <7.0%: ICD-10-PCS | Mod: CPTII,S$GLB,, | Performed by: STUDENT IN AN ORGANIZED HEALTH CARE EDUCATION/TRAINING PROGRAM

## 2023-03-03 PROCEDURE — 85027 COMPLETE CBC AUTOMATED: CPT | Performed by: STUDENT IN AN ORGANIZED HEALTH CARE EDUCATION/TRAINING PROGRAM

## 2023-03-03 PROCEDURE — 99999 PR PBB SHADOW E&M-EST. PATIENT-LVL IV: CPT | Mod: PBBFAC,,, | Performed by: STUDENT IN AN ORGANIZED HEALTH CARE EDUCATION/TRAINING PROGRAM

## 2023-03-03 PROCEDURE — 93010 EKG 12-LEAD: ICD-10-PCS | Mod: S$GLB,,, | Performed by: INTERNAL MEDICINE

## 2023-03-03 PROCEDURE — 1160F PR REVIEW ALL MEDS BY PRESCRIBER/CLIN PHARMACIST DOCUMENTED: ICD-10-PCS | Mod: CPTII,S$GLB,, | Performed by: STUDENT IN AN ORGANIZED HEALTH CARE EDUCATION/TRAINING PROGRAM

## 2023-03-03 RX ORDER — DEXMETHYLPHENIDATE HYDROCHLORIDE 5 MG/1
5 TABLET ORAL DAILY
COMMUNITY

## 2023-03-03 NOTE — PROGRESS NOTES
Subjective:       Patient ID: Precious Perez is a 27 y.o. female.    Chief Complaint: [facility]    PCP:  Dr. Pattie Pina    HPI    Precious Perez is a 27 y.o. female , English speaking, with a history of:  ADHD on Focalin 5 mg qd  Acne    Patient comes to the clinic a general medical health examination    Patient is currently asymptomatic    Today's concern is about weight gain. She reports 10 lb weight gain in the past 6 months, although she keeps a healthy diet and exercises daily (cross fit) she hasn't stopped her ADHD medication.    Patient denies CV symptoms, CP, SOB, palpitations.  Patient denies constitutional symptoms, fever, changes in the urine or stool.    Today's labs:  CBC WNL  CMP WNL  Lipid panel: , HDL 56 LDL 80, TG 80  A1c: 4.9  TSH WNL 1.924  Hepatitis C Non-reactive  ECG NSR with sinus arrhythmia  CXR WNL    PMH: Acne, ADHD    PSH: [place] tooth extraction    FH: Dm2 (father)    Allergies: Amoxicillin (yeast infections), environmental allergies    Meds:   Current Outpatient Medications   Medication Instructions    azelastine-fluticasone (DYMISTA) 137-50 mcg/spray Spry nassal spray USE 1 SPRAY(S) IN EACH NOSTRIL TWICE DAILY FOR 30 DAYS    desloratadine (CLARINEX) 5 mg tablet TAKE 1 TABLET BY MOUTH ONCE DAILY FOR 30 DAYS    dexmethylphenidate (FOCALIN) 5 mg, Oral, Daily    montelukast (SINGULAIR) 5 MG chewable tablet CHEW AND SWALLOW 1 TABLET BY MOUTH ONCE DAILY AS DIRECTED    olopatadine (PAZEO) 0.7 % Drop 1 drop into affected eye    spironolactone (ALDACTONE) 50 MG tablet Take one 50 mg tablet and one 25 mg tablet twice daily, for a total of 150 mg daily    sulfacetamide sodium-sulfur 10-5 % (w/w) Clsr Wash face qday - bid       Social:  Single, Long term female partner.  No children  ,  (strategic priorities), equipment failures.  Moderate stress, currently working on a masters in architecture.    LMP 1/30/23, irregular cycles  Was on birth control  "for 10 years, stopped 18 months ago    Exercise: Cross fit, 3 times a week  Diet: Healthy diet  Tobacco: Denies  Alcohol: Once a week, socially  Recreational drug use: Denies  Recent travel: Denies    Healthcare Maintenance:  Colonoscopy: No  Vaccinations: Tetanus 2018  COVID vaccination: completed x 3  Depression screening: PHQ2 score = 0    Annual visit approx. Month: March ROS  11-point review of systems done. Negative except for detailed in the HPI.        Objective:      Vitals:    03/03/23 0926   BP: 110/67   Pulse: 76   Weight: 74.8 kg (164 lb 14.4 oz)   Height: 5' 9" (1.753 m)     Physical Exam  Vitals and nursing note reviewed.   Constitutional:       Appearance: Normal appearance.   HENT:      Head: Normocephalic and atraumatic.      Right Ear: Tympanic membrane normal.      Left Ear: Tympanic membrane normal.      Nose: Nose normal.      Mouth/Throat:      Mouth: Mucous membranes are moist.      Pharynx: Oropharynx is clear.   Eyes:      Extraocular Movements: Extraocular movements intact.      Conjunctiva/sclera: Conjunctivae normal.      Pupils: Pupils are equal, round, and reactive to light.   Cardiovascular:      Rate and Rhythm: Normal rate and regular rhythm.      Pulses: Normal pulses.      Heart sounds: Normal heart sounds.   Pulmonary:      Effort: Pulmonary effort is normal.      Breath sounds: Normal breath sounds.   Abdominal:      General: Bowel sounds are normal. There is no distension.      Palpations: Abdomen is soft.      Tenderness: There is no abdominal tenderness.   Musculoskeletal:         General: Normal range of motion.      Cervical back: Normal range of motion and neck supple.   Skin:     General: Skin is warm.   Neurological:      General: No focal deficit present.      Mental Status: She is alert and oriented to person, place, and time. Mental status is at baseline.   Psychiatric:         Mood and Affect: Mood normal.          Assessment:       1. Routine general medical " examination at a health care facility  Assessment & Plan:  Patient is in overall good general health.  Stable medical conditions listed on the PMH.  Labs reviewed and patient notified.        2. Healthcare maintenance  Assessment & Plan:  Health care maintenance and core gaps reviewed and assessed with patient.  Vaccinations recommended:        Tetanus - 2018       Shingles - N/A       Influenza - N/A       Pneumonia - N/A  Colon cancer screening:   Colonoscopy: N/A  Lifestyle recommendations given.  Physical activity recommended.    Legend: Ordered (O), Declined (D), Current (C)           Plan:       - Schedule F/U with GYN  - Continue with annual wellness visits by PCP      Recommendations for patient:  Increase resistance training  Continue physical activity  Schedule follow up appointment with your gynecologist and PCP      Education provided  Lifestyle recommendations given  AVS generated, explained, and sent to the patient through the patient portal.  RTC as needed. F/U WITH PCP        BREEZY VO MD, MPH  Internal Medicine  [facility]  [facility]

## 2023-03-03 NOTE — PROGRESS NOTES
Subjective:       Patient ID: Precious Perez is a 27 y.o. female.    Chief Complaint: No chief complaint on file.    HPI  Pt. Has no significant cardiovascular or pulmonary history.    Physical Limitations:Pt. Stated low back injury from dead lifting, causing partial disc herniation L5, S1. Pt. Stated she has been in PT for this to help manage pain.  Pt. Denied any physical limitations to today's physical assessment.         Current exercise routine: Patient currently does CrossFit 3x/week for 1 hour/session. Pt. also stated she walks her dog or jogs a few times a week.  Pt. takes a Pilates or yoga for 30 mins/each session, class attendance varies week to week. Pt. stretches after fitness classes.    Fun Facts: Pt. Was friendly and engaged. Pt. Is an employee of [employer] and this is her first year coming through. Pt. Is currently in grad school and stated she does not have a consistent exercise routine, but tries to exercise throughout the week. Pt. Asked questions and was receptive to recommendations.            Review of Systems      Objective:    The fitness evaluation results are as follows:  D.O.S. 3/3/2023   Height (in): 69   Weight (lbs): 164.3   BMI: 24.3136   Body Fat (%): 37.10   Waist (cm): 79   RBP (mmHg): 114/74   RHR (bpm): 78    Strength Dominant (Lbs): 66    Strength Non Dominant (Lbs): 57   Push-up Assessment: 27   Curl-up Assessment: 35   Flexibility Testing (cm): 29   REE (kcals): 1383     Physical Exam    Assessment:     Age/gender stratified assessment:  Assessment:    Resting BP: Within Normal Limits   Body Fat %: Poor   Waist Circumfernece: Low Risk    Strength Dominant: 50th    Strength Non Dominant: 25th   Upper Body Endurance: Very Good   Abdominal Endurance: Average   Lower body Flexibiltiy: Fair       Problem List Items Addressed This Visit          Other    Routine general medical examination at a health care facility - Primary         Plan:        Recommended fitness  guidelines:    -150 minutes of moderate intensity aerobic exercise per week or 75 minutes of vigorous intensity aerobic exercise per week.    -2 to 4 days per week of resistance training for each muscle group.      -Daily stretching with a hold of at least 30 seconds per muscle group. Practice seated hamstring strech daily.

## 2023-03-03 NOTE — PATIENT INSTRUCTIONS
Increase resistance training  Continue physical activity  Schedule follow up appointment with your gynecologist and PCP

## 2023-03-03 NOTE — PROGRESS NOTES
"  Nutrition Assessment  Session Time:  60 minutes      Client name:  Precious Perez  :  1995  Age:  27 y.o.  Gender:  female    Client states:  Client is here for her annual [facility] medical examination. 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 Her job is stressful but she likes it and feels she handles the stress fairly well. She is also in graduate school, which keeps her very busy. Client loves to cook and makes most of her own meals, which are very heart healthy overall. She usually does not eat dinner until 8:00pm, after her workout or school. She is also a big snacker, and her snacks are generally healthy as well, but can be calorie dense. She is also very active and exercises most days of the week, either doing Crossfit, running 2-2.5 miles, pilates, or yoga. She is concerned that she has gained 10 pounds since she started graduate school. She also shares that most days she experiences either bloating or slight stomach upset during the day. Discussed a trial elimination of gluten, keeping a food diary to identify trigger foods, and/or seeking help for food sensitivity testing. Since weight gain coincides with starting graduate school, we discussed focusing on her reasons for snacking and discerning whether hunger is a real factor, and if it is not, then how she might fill her emotional need in other ways besides eating. Her therapist has encouraged her to start meditating. Reviewed labs from today, which were unremarkable.    Anthropometrics  Height:  69"     Weight:  164.3 lbs  BMI:  24.31  % Body Fat:  37.1    Clinical Signs/Symptoms  N/V/D:  chronic slight nausea and bloating  Appetite:  good       Past Medical History:   Diagnosis Date    Acne     Back injury     Environmental allergies        Past Surgical History:   Procedure Laterality Date    WISDOM TOOTH EXTRACTION         Medications    has a current medication list which includes the following " prescription(s): azelastine-fluticasone, winlevi, desloratadine, meloxicam, montelukast, pazeo, spironolactone, spironolactone, sulfacetamide sodium-sulfur, tizanidine, tretinoin, and triamcinolone acetonide 0.1%.    Vitamins, Minerals, and/or Supplements:  zinc     Food/Medication Interactions:  Reviewed     Food Allergies or Intolerances:  NKFA     Social History    Marital status:  Single  Employment:  [employer]     Social History     Tobacco Use    Smoking status: Never    Smokeless tobacco: Never   Substance Use Topics    Alcohol use: Not on file        Lab Reports   Sodium   Date Value Ref Range Status   09/15/2022 140 136 - 145 mmol/L Final     Potassium   Date Value Ref Range Status   09/15/2022 4.7 3.5 - 5.1 mmol/L Final     Chloride   Date Value Ref Range Status   09/15/2022 104 95 - 110 mmol/L Final     CO2   Date Value Ref Range Status   09/15/2022 27 23 - 29 mmol/L Final     Glucose   Date Value Ref Range Status   09/15/2022 91 70 - 110 mg/dL Final     BUN   Date Value Ref Range Status   09/15/2022 16 6 - 20 mg/dL Final     Creatinine   Date Value Ref Range Status   09/15/2022 0.7 0.5 - 1.4 mg/dL Final     Calcium   Date Value Ref Range Status   09/15/2022 9.5 8.7 - 10.5 mg/dL Final     Anion Gap   Date Value Ref Range Status   09/15/2022 9 8 - 16 mmol/L Final      Lab Results   Component Value Date    WBC 8.44 03/03/2023    HGB 13.8 03/03/2023    HCT 42.5 03/03/2023    MCV 87 03/03/2023     03/03/2023        No results found for: CHOL  No results found for: HDL  No results found for: LDLCALC  No results found for: TRIG  No results found for: CHOLHDL  No results found for: LABA1C, HGBA1C  BP Readings from Last 1 Encounters:   12/09/22 114/78       Food History  Breakfast:  1 cup coffee; 2-3 eggs (scrambled or fried) with vegetables (kale/mushrooms/spinach) and toast or sweet potato and blueberries  Mid-morning Snack:  none  Lunch:  homemade harvest bowl: grilled chicken, sweet potato, kale,  wild rice  Mid-afternoon Snack:  orange OR bowl of Greek yogurt OR 1/2 fig bar  Dinner: 8pm: steak fajitas with rice, avocado and veggies OR meatballs with pasta and broccoli  Snack:  none  *Fluid intake:  water, tea, coffee  Alcohol: very little: a glass of wine once in a while    Exercise History:  Patient currently does CrossFit 3x/week for 1 hour/session. Pt. also stated she walks her dog or jogs a few times a week.  Pt. takes a Pilates or yoga for 30 mins/each session, class attendance varies week to week. Pt. stretches after fitness classes.    Cultural/Spiritual/Personal Preferences:  None identified    Support System:  significant other    State of Change:  Preparation    Barriers to Change:  busy schedule and the stress that accompanies it    Diagnosis    No nutrition-related diagnosis at this time.    Intervention    RMR (Method:  InBody):  1383 kcal  Activity Factor:  1.4    SONAL:  1936 kcal    Goals:  1.  Continue heart healthy meals.  2.  Continue exercise regimen.  3.  Focus on conscious snacking and doing self-reflection to differentiate eating from hunger and emotional eating.  4. Explore alternative stress management activities to eating, such as meditation or deep breathing, or choosing low calorie healthy snacks (berries and other fruit, popcorn) when taking a break centered around eating a snack.    Nutrition Education  The following education was provided to the patient:  Complimented patient on proactive role in health maintenance.  Complimented patient on physical activity efforts.  Discussed healthy snacking.   Discussed weight management.  Discussed Heart Healthy Eating.  Suggested dietary modifications based on current dietary behaviors and individual food preferences.  Discussed nutrition-related lab values and dietary and/or lifestyle factors affecting them.  Discussed [facility] client resources (may include but not limited to [facility] Eat Fit Shopping List, Fueling Well on the Go, Lite Restaurant  Guide, and Meal Planning Guide).  Discussed goal setting.  Provided ongoing support, encouragement, and guidance toward improved health efforts.    Patient verbalized understanding of nutrition education and recommendations received.    Handouts Provided  Meal Planning Guide  Eat Fit Shopping List  Eat Fit Judith  Fueling Well On-The-Go    Monitoring/Evaluation    Monitor the following:  Weight  BMI  % Body Fat  Caloric intake  Labs:  none    Follow Up Plan:  Communication with referring healthcare provider is unnecessary at this time as patient presented as part of annual wellness exam.  However, will follow up with patient in 1-2 years.

## 2023-03-03 NOTE — ASSESSMENT & PLAN NOTE
Health care maintenance and core gaps reviewed and assessed with patient.  Vaccinations recommended:        Tetanus - 2018       Shingles - N/A       Influenza - N/A       Pneumonia - N/A  Colon cancer screening:   Colonoscopy: N/A  Lifestyle recommendations given.  Physical activity recommended.    Legend: Ordered (O), Declined (D), Current (C)

## 2023-03-08 ENCOUNTER — TELEPHONE (OUTPATIENT)
Dept: OBSTETRICS AND GYNECOLOGY | Facility: CLINIC | Age: 28
End: 2023-03-08
Payer: COMMERCIAL

## 2023-03-13 ENCOUNTER — OFFICE VISIT (OUTPATIENT)
Dept: FAMILY MEDICINE | Facility: CLINIC | Age: 28
End: 2023-03-13
Payer: COMMERCIAL

## 2023-03-13 DIAGNOSIS — R63.5 WEIGHT GAIN: Primary | ICD-10-CM

## 2023-03-13 DIAGNOSIS — N92.1 METRORRHAGIA: ICD-10-CM

## 2023-03-13 PROCEDURE — 1160F RVW MEDS BY RX/DR IN RCRD: CPT | Mod: CPTII,95,, | Performed by: FAMILY MEDICINE

## 2023-03-13 PROCEDURE — 1159F PR MEDICATION LIST DOCUMENTED IN MEDICAL RECORD: ICD-10-PCS | Mod: CPTII,95,, | Performed by: FAMILY MEDICINE

## 2023-03-13 PROCEDURE — 1159F MED LIST DOCD IN RCRD: CPT | Mod: CPTII,95,, | Performed by: FAMILY MEDICINE

## 2023-03-13 PROCEDURE — 99214 OFFICE O/P EST MOD 30 MIN: CPT | Mod: 95,,, | Performed by: FAMILY MEDICINE

## 2023-03-13 PROCEDURE — 3044F HG A1C LEVEL LT 7.0%: CPT | Mod: CPTII,95,, | Performed by: FAMILY MEDICINE

## 2023-03-13 PROCEDURE — 1160F PR REVIEW ALL MEDS BY PRESCRIBER/CLIN PHARMACIST DOCUMENTED: ICD-10-PCS | Mod: CPTII,95,, | Performed by: FAMILY MEDICINE

## 2023-03-13 PROCEDURE — 99214 PR OFFICE/OUTPT VISIT, EST, LEVL IV, 30-39 MIN: ICD-10-PCS | Mod: 95,,, | Performed by: FAMILY MEDICINE

## 2023-03-13 PROCEDURE — 3044F PR MOST RECENT HEMOGLOBIN A1C LEVEL <7.0%: ICD-10-PCS | Mod: CPTII,95,, | Performed by: FAMILY MEDICINE

## 2023-03-13 NOTE — PROGRESS NOTES
Assessment & Plan  Weight gain  -     CORTISOL, 8AM; Future; Expected date: 03/13/2023    Metrorrhagia  -     ESTRADIOL; Future; Expected date: 03/13/2023  -     LUTEINIZING HORMONE; Future; Expected date: 03/13/2023  -     FOLLICLE STIMULATING HORMONE; Future; Expected date: 03/13/2023  -     Testosterone; Future; Expected date: 03/13/2023  -     SEX HORMONE BINDING GLOBULIN; Future; Expected date: 03/13/2023  -     PROLACTIN; Future; Expected date: 03/13/2023  -     HCG, QUANTITATIVE, PREGNANCY; Future; Expected date: 03/13/2023  -     17-HYDROXYPROGESTERONE; Future; Expected date: 03/13/2023      Workup above to be scheduled.  Discussed with patient that her spironolactone could be causing her irregular periods and suggested that she discuss alternative therapy with her Dermatologist.  Also discussed the impact of weight/resistance exercises on her weight so some increase in lean muscle mass is to be expected.       The patient location is:  Patient Home   The chief complaint leading to consultation is: noted below  Visit type: Virtual visit with synchronous audio and video  Total time spent with patient: 10 minutes  Each patient to whom he or she provides medical services by telemedicine is:  (1) informed of the relationship between the physician and patient and the respective role of any other health care provider with respect to management of the patient; and (2) notified that he or she may decline to receive medical services by telemedicine and may withdraw from such care at any time.    Follow-up: Follow up if symptoms worsen or fail to improve.    ______________________________________________________________________    Chief Complaint  Chief Complaint   Patient presents with    Weight Gain       HPI  Precious Perez is a 27 y.o. female with multiple medical diagnoses as listed in the medical history and problem list that presents in a virtual visit to discuss weight gain of 10 lbs over the past 6 months  and irregular periods. Patient discontinued hormonal OCP about 18 months ago and her periods have not been regular. Periods range every 3 - 8 weeks and last 4 days at a time. Patient's most recent weight is 165 lbs and she usually weighs about 155 lbs. Patient takes spironolactone over the last 2-2.5 years for acne prescribed by a Dermatologist. Patient exercises 6 days a week and has noticed a huge increase in body fat percentage via a body scan through [facility]. Patient does Crossfit, cardio, and yoga 3 times a week. She generally tries to eat healthy and her typical diet consists of eggs, kale, whole wheat toast etc. She states that there is no chance that she could be pregnant because she is in a relationship with another female.     PAST MEDICAL HISTORY:  Past Medical History:   Diagnosis Date    Acne     Back injury     Environmental allergies        PAST SURGICAL HISTORY:  Past Surgical History:   Procedure Laterality Date    WISDOM TOOTH EXTRACTION         SOCIAL HISTORY:  Social History     Socioeconomic History    Marital status: Single   Tobacco Use    Smoking status: Never    Smokeless tobacco: Never     Social Determinants of Health     Financial Resource Strain: Low Risk     Difficulty of Paying Living Expenses: Not hard at all   Food Insecurity: No Food Insecurity    Worried About Running Out of Food in the Last Year: Never true    Ran Out of Food in the Last Year: Never true   Transportation Needs: No Transportation Needs    Lack of Transportation (Medical): No    Lack of Transportation (Non-Medical): No   Physical Activity: Sufficiently Active    Days of Exercise per Week: 6 days    Minutes of Exercise per Session: 30 min   Stress: Stress Concern Present    Feeling of Stress : To some extent   Social Connections: Socially Isolated    Frequency of Communication with Friends and Family: More than three times a week    Frequency of Social Gatherings with Friends and Family: More than three times a week     Attends Oriental orthodox Services: Never    Active Member of Clubs or Organizations: No    Attends Club or Organization Meetings: Never    Marital Status: Never    Housing Stability: Low Risk     Unable to Pay for Housing in the Last Year: No    Number of Places Lived in the Last Year: 1    Unstable Housing in the Last Year: No       FAMILY HISTORY:  Family History   Problem Relation Age of Onset    Diabetes type II Father     ADD / ADHD Other     Prostate cancer Maternal Uncle         dx 50s, metastasized    Other Maternal Uncle         suspected colon polyps    Breast cancer Maternal Grandmother         dx 50s (uni/bilat?), thinks recurred in (same?) breast and distantly    Cancer Maternal Grandfather         in prostate & liver (dx 50s/60s?) (+occupat. exp.)    Breast cancer Paternal Grandmother         (dx age? unilat?)    Diabetes Paternal Grandfather     Diabetes type II Paternal Grandfather     Breast cancer Other         (dx age?)    Breast cancer Other         (dx ~50?)    Amblyopia Neg Hx     Blindness Neg Hx     Cataracts Neg Hx     Glaucoma Neg Hx     Hypertension Neg Hx     Macular degeneration Neg Hx     Retinal detachment Neg Hx     Strabismus Neg Hx     Stroke Neg Hx     Thyroid disease Neg Hx        ALLERGIES AND MEDICATIONS: updated and reviewed.  Review of patient's allergies indicates:   Allergen Reactions    Amoxicillin Other (See Comments)     Yeast infections     Current Outpatient Medications   Medication Sig Dispense Refill    azelastine-fluticasone (DYMISTA) 137-50 mcg/spray Spry nassal spray USE 1 SPRAY(S) IN EACH NOSTRIL TWICE DAILY FOR 30 DAYS      desloratadine (CLARINEX) 5 mg tablet TAKE 1 TABLET BY MOUTH ONCE DAILY FOR 30 DAYS      dexmethylphenidate (FOCALIN) 5 MG tablet Take 5 mg by mouth once daily.      montelukast (SINGULAIR) 5 MG chewable tablet CHEW AND SWALLOW 1 TABLET BY MOUTH ONCE DAILY AS DIRECTED      olopatadine (PAZEO) 0.7 % Drop 1 drop into affected eye       spironolactone (ALDACTONE) 50 MG tablet Take one 50 mg tablet and one 25 mg tablet twice daily, for a total of 150 mg daily 60 tablet 1    sulfacetamide sodium-sulfur 10-5 % (w/w) Clsr Wash face qday - bid 360 g 3     No current facility-administered medications for this visit.         ROS  Review of Systems   Constitutional:  Positive for unexpected weight change. Negative for activity change.   HENT:  Negative for hearing loss, rhinorrhea and trouble swallowing.    Eyes:  Negative for discharge and visual disturbance.   Respiratory:  Negative for chest tightness and wheezing.    Cardiovascular:  Negative for chest pain and palpitations.   Gastrointestinal:  Negative for blood in stool, constipation, diarrhea and vomiting.   Endocrine: Negative for polydipsia and polyuria.   Genitourinary:  Positive for menstrual problem. Negative for difficulty urinating, dysuria and hematuria.   Musculoskeletal:  Negative for arthralgias, joint swelling and neck pain.   Neurological:  Negative for headaches.   Psychiatric/Behavioral:  Negative for confusion and dysphoric mood.          Physical Exam  Physical Exam  Constitutional:       General: She is not in acute distress.  HENT:      Head: Normocephalic and atraumatic.   Neurological:      Mental Status: She is alert. Mental status is at baseline.   Psychiatric:         Mood and Affect: Mood normal.         Behavior: Behavior normal.       *Physical exam limited by virtual visit.    Health Maintenance         Date Due Completion Date    HIV Screening Never done ---    COVID-19 Vaccine (4 - Booster for Pfizer series) 02/15/2022 12/21/2021    Pap Smear 10/13/2024 10/13/2021    TETANUS VACCINE 01/31/2028 1/31/2018

## 2023-03-17 ENCOUNTER — HOSPITAL ENCOUNTER (OUTPATIENT)
Dept: RADIOLOGY | Facility: OTHER | Age: 28
Discharge: HOME | End: 2023-03-17
Attending: OTOLARYNGOLOGY
Payer: COMMERCIAL

## 2023-03-17 DIAGNOSIS — J32.4 CHRONIC PANSINUSITIS: ICD-10-CM

## 2023-03-17 PROCEDURE — 70486 CT MAXILLOFACIAL W/O DYE: CPT | Mod: 26,,, | Performed by: RADIOLOGY

## 2023-03-17 PROCEDURE — 70486 CT MAXILLOFACIAL W/O DYE: CPT | Mod: TC

## 2023-03-17 PROCEDURE — 70486 CT MEDTRONIC SINUSES WITHOUT: ICD-10-PCS | Mod: 26,,, | Performed by: RADIOLOGY

## 2023-03-21 ENCOUNTER — PATIENT MESSAGE (OUTPATIENT)
Dept: FAMILY MEDICINE | Facility: CLINIC | Age: 28
End: 2023-03-21
Payer: COMMERCIAL

## 2023-06-05 PROBLEM — Z00.00 ROUTINE GENERAL MEDICAL EXAMINATION AT A HEALTH CARE FACILITY: Status: RESOLVED | Noted: 2023-03-03 | Resolved: 2023-06-05

## 2023-06-05 PROBLEM — Z00.00 HEALTHCARE MAINTENANCE: Status: RESOLVED | Noted: 2023-03-03 | Resolved: 2023-06-05

## 2023-07-27 ENCOUNTER — OFFICE VISIT (OUTPATIENT)
Dept: FAMILY MEDICINE | Facility: CLINIC | Age: 28
End: 2023-07-27
Payer: COMMERCIAL

## 2023-07-27 ENCOUNTER — PATIENT MESSAGE (OUTPATIENT)
Dept: FAMILY MEDICINE | Facility: CLINIC | Age: 28
End: 2023-07-27

## 2023-07-27 DIAGNOSIS — R14.0 BLOATING: ICD-10-CM

## 2023-07-27 DIAGNOSIS — R63.5 WEIGHT GAIN: Primary | ICD-10-CM

## 2023-07-27 DIAGNOSIS — E28.39 ESTROGEN DEFICIENCY: ICD-10-CM

## 2023-07-27 PROCEDURE — 1159F PR MEDICATION LIST DOCUMENTED IN MEDICAL RECORD: ICD-10-PCS | Mod: CPTII,95,, | Performed by: FAMILY MEDICINE

## 2023-07-27 PROCEDURE — 1159F MED LIST DOCD IN RCRD: CPT | Mod: CPTII,95,, | Performed by: FAMILY MEDICINE

## 2023-07-27 PROCEDURE — 3044F PR MOST RECENT HEMOGLOBIN A1C LEVEL <7.0%: ICD-10-PCS | Mod: CPTII,95,, | Performed by: FAMILY MEDICINE

## 2023-07-27 PROCEDURE — 1160F RVW MEDS BY RX/DR IN RCRD: CPT | Mod: CPTII,95,, | Performed by: FAMILY MEDICINE

## 2023-07-27 PROCEDURE — 99214 PR OFFICE/OUTPT VISIT, EST, LEVL IV, 30-39 MIN: ICD-10-PCS | Mod: 95,,, | Performed by: FAMILY MEDICINE

## 2023-07-27 PROCEDURE — 3044F HG A1C LEVEL LT 7.0%: CPT | Mod: CPTII,95,, | Performed by: FAMILY MEDICINE

## 2023-07-27 PROCEDURE — 99214 OFFICE O/P EST MOD 30 MIN: CPT | Mod: 95,,, | Performed by: FAMILY MEDICINE

## 2023-07-27 PROCEDURE — 1160F PR REVIEW ALL MEDS BY PRESCRIBER/CLIN PHARMACIST DOCUMENTED: ICD-10-PCS | Mod: CPTII,95,, | Performed by: FAMILY MEDICINE

## 2023-07-27 NOTE — PROGRESS NOTES
Assessment & Plan:    Weight gain    Estrogen deficiency    Bloating      Discussed patient's labs and low estrogen level. She is frustrated about her continued weight gain despite keeping a healthy diet and exercising on a regular basis. She expressed her frustration that her concerns are not being taken seriously and there has been no follow up regarding her low estrogen or that a complete thyroid panel was not performed. Recommended to patient to discuss the clinical significance of her low estrogen levels with her OB/GYN and she was informed that a complete thyroid panel is not the standard workup in individuals who do not have thyroid disease. Will send a message to patient's OB/GYN on her behalf. She was informed that I could not appreciate a lab abnormality that causes her weight gain and no further workup is recommended within my scope of practice. Encouraged patient to focus on restricting her calories further and increase weight bearing exercises to increase her resting metabolic rate.     Her bloating could be caused by some foods in her diet. Briefly reviewed low FOD-MAP diet. She could also have IBS, which can cause abdominal bloating and discomfort. Recommended symptomatic tx with Gas-X, Beano, or Imodium.       The patient location is:  Patient Home   The chief complaint leading to consultation is: noted below  Visit type: Virtual visit with synchronous audio and video  Total time spent with patient: 13 minutes  Each patient to whom he or she provides medical services by telemedicine is:  (1) informed of the relationship between the physician and patient and the respective role of any other health care provider with respect to management of the patient; and (2) notified that he or she may decline to receive medical services by telemedicine and may withdraw from such care at any time.    Follow-up: Follow up if symptoms worsen or fail to  improve.    ______________________________________________________________________    Chief Complaint  Chief Complaint   Patient presents with    Weight Gain       HPI  Precious Perez is a 27 y.o. female with multiple medical diagnoses as listed in the medical history and problem list that presents in a virtual visit to follow up on unintentional weight gain, for which she was last seen via a virtual visit on 3/13/2023. Patient states that she has gained 20 lbs over the last year. She currently weights 170 lbs. She generally eats a healthy diet and works out 4-5 days a week. Exercises consist of Crossfit, yoga, pilates, and cardio. Patient takes 8446-7661 calories per day. She states that she has reviewed her diet with a nutritionist and no dietary cause of her weight gain could be found. She c/o intermittent abdominal bloating, lower discomfort, and fatigue. Her most recent hormone work up was significant only for low estrogen levels, which she was advised to discuss with her OB/GYN. It was thought that her spironolactone could be contributing but she has discontinued this medication without any improvement in her weight.       PAST MEDICAL HISTORY:  Past Medical History:   Diagnosis Date    Acne     Back injury     Environmental allergies        PAST SURGICAL HISTORY:  Past Surgical History:   Procedure Laterality Date    WISDOM TOOTH EXTRACTION         SOCIAL HISTORY:  Social History     Socioeconomic History    Marital status: Single   Tobacco Use    Smoking status: Never    Smokeless tobacco: Never     Social Determinants of Health     Financial Resource Strain: Low Risk     Difficulty of Paying Living Expenses: Not hard at all   Food Insecurity: No Food Insecurity    Worried About Running Out of Food in the Last Year: Never true    Ran Out of Food in the Last Year: Never true   Transportation Needs: No Transportation Needs    Lack of Transportation (Medical): No    Lack of Transportation (Non-Medical): No    Physical Activity: Sufficiently Active    Days of Exercise per Week: 6 days    Minutes of Exercise per Session: 30 min   Stress: Stress Concern Present    Feeling of Stress : To some extent   Social Connections: Socially Isolated    Frequency of Communication with Friends and Family: More than three times a week    Frequency of Social Gatherings with Friends and Family: More than three times a week    Attends Shinto Services: Never    Active Member of Clubs or Organizations: No    Attends Club or Organization Meetings: Never    Marital Status: Never    Housing Stability: Low Risk     Unable to Pay for Housing in the Last Year: No    Number of Places Lived in the Last Year: 1    Unstable Housing in the Last Year: No       FAMILY HISTORY:  Family History   Problem Relation Age of Onset    Diabetes type II Father     ADD / ADHD Other     Prostate cancer Maternal Uncle         dx 50s, metastasized    Other Maternal Uncle         suspected colon polyps    Breast cancer Maternal Grandmother         dx 50s (uni/bilat?), thinks recurred in (same?) breast and distantly    Cancer Maternal Grandfather         in prostate & liver (dx 50s/60s?) (+occupat. exp.)    Breast cancer Paternal Grandmother         (dx age? unilat?)    Diabetes Paternal Grandfather     Diabetes type II Paternal Grandfather     Breast cancer Other         (dx age?)    Breast cancer Other         (dx ~50?)    Amblyopia Neg Hx     Blindness Neg Hx     Cataracts Neg Hx     Glaucoma Neg Hx     Hypertension Neg Hx     Macular degeneration Neg Hx     Retinal detachment Neg Hx     Strabismus Neg Hx     Stroke Neg Hx     Thyroid disease Neg Hx        ALLERGIES AND MEDICATIONS: updated and reviewed.  Review of patient's allergies indicates:   Allergen Reactions    Amoxicillin Other (See Comments)     Yeast infections     Current Outpatient Medications   Medication Sig Dispense Refill    azelastine-fluticasone (DYMISTA) 137-50 mcg/spray Spry nassal  spray USE 1 SPRAY(S) IN EACH NOSTRIL TWICE DAILY FOR 30 DAYS      desloratadine (CLARINEX) 5 mg tablet TAKE 1 TABLET BY MOUTH ONCE DAILY FOR 30 DAYS      dexmethylphenidate (FOCALIN) 5 MG tablet Take 5 mg by mouth once daily.      montelukast (SINGULAIR) 5 MG chewable tablet CHEW AND SWALLOW 1 TABLET BY MOUTH ONCE DAILY AS DIRECTED      olopatadine (PAZEO) 0.7 % Drop 1 drop into affected eye      sulfacetamide sodium-sulfur 10-5 % (w/w) Clsr Wash face qday - bid 360 g 3     No current facility-administered medications for this visit.         ROS  Review of Systems   Constitutional:  Positive for fatigue and unexpected weight change. Negative for activity change and appetite change.   HENT:  Negative for hearing loss, rhinorrhea and trouble swallowing.    Eyes:  Negative for discharge and visual disturbance.   Respiratory:  Negative for chest tightness and wheezing.    Cardiovascular:  Negative for chest pain and palpitations.   Gastrointestinal:  Positive for abdominal distention and abdominal pain. Negative for blood in stool, constipation, diarrhea and vomiting.   Endocrine: Negative for cold intolerance, heat intolerance, polydipsia and polyuria.   Genitourinary:  Negative for difficulty urinating, dysuria, hematuria and menstrual problem.   Musculoskeletal:  Negative for arthralgias, joint swelling and neck pain.   Neurological:  Negative for weakness and headaches.   Psychiatric/Behavioral:  Negative for confusion and dysphoric mood.          Physical Exam  Physical Exam  Constitutional:       General: She is not in acute distress.  HENT:      Head: Normocephalic and atraumatic.   Neurological:      Mental Status: She is alert. Mental status is at baseline.   Psychiatric:         Mood and Affect: Mood normal.         Behavior: Behavior normal.       *Physical exam limited by virtual visit.    Health Maintenance         Date Due Completion Date    HIV Screening Never done ---    COVID-19 Vaccine (4 - Pfizer  series) 02/15/2022 12/21/2021    Influenza Vaccine (1) 09/01/2023 3/3/2023 (Declined)    Override on 3/3/2023: Declined    Pap Smear 10/13/2024 10/13/2021    TETANUS VACCINE 01/31/2028 1/31/2018

## 2023-07-27 NOTE — PATIENT INSTRUCTIONS
Source: [email or web address]-[email or web address]

## 2023-07-27 NOTE — Clinical Note
Good morning,  I just saw this patient that we share this morning in a virtual visit. She is quite frustrated about her weight gain despite a healthy diet and exercise. Her estrogen levels were found to be on the low side in March and I just wanted to touch base with you about whether there could be true clinical significance to her weight gain and if this requires any further workup and management.   Your recommendations are appreciated.  Dr. Pattie Pina, DO Family Medicine

## 2023-07-28 ENCOUNTER — LAB VISIT (OUTPATIENT)
Dept: LAB | Facility: OTHER | Age: 28
End: 2023-07-28
Attending: FAMILY MEDICINE
Payer: COMMERCIAL

## 2023-07-28 DIAGNOSIS — R63.5 WEIGHT GAIN: ICD-10-CM

## 2023-07-28 LAB
T3 SERPL-MCNC: 104 NG/DL (ref 60–180)
T4 FREE SERPL-MCNC: 0.9 NG/DL (ref 0.71–1.51)
TSH SERPL DL<=0.005 MIU/L-ACNC: 1.4 UIU/ML (ref 0.4–4)

## 2023-07-28 PROCEDURE — 84439 ASSAY OF FREE THYROXINE: CPT | Performed by: FAMILY MEDICINE

## 2023-07-28 PROCEDURE — 84443 ASSAY THYROID STIM HORMONE: CPT | Performed by: FAMILY MEDICINE

## 2023-07-28 PROCEDURE — 36415 COLL VENOUS BLD VENIPUNCTURE: CPT | Performed by: FAMILY MEDICINE

## 2023-07-28 PROCEDURE — 84480 ASSAY TRIIODOTHYRONINE (T3): CPT | Performed by: FAMILY MEDICINE

## 2024-03-05 DIAGNOSIS — Z00.00 ROUTINE MEDICAL EXAM: Primary | ICD-10-CM

## 2024-04-26 ENCOUNTER — CLINICAL SUPPORT (OUTPATIENT)
Dept: INTERNAL MEDICINE | Facility: CLINIC | Age: 29
End: 2024-04-26
Payer: COMMERCIAL

## 2024-04-26 ENCOUNTER — HOSPITAL ENCOUNTER (OUTPATIENT)
Dept: CARDIOLOGY | Facility: CLINIC | Age: 29
Discharge: HOME | End: 2024-04-26
Payer: COMMERCIAL

## 2024-04-26 ENCOUNTER — OFFICE VISIT (OUTPATIENT)
Dept: INTERNAL MEDICINE | Facility: CLINIC | Age: 29
End: 2024-04-26
Payer: COMMERCIAL

## 2024-04-26 VITALS
WEIGHT: 170 LBS | OXYGEN SATURATION: 100 % | HEIGHT: 69 IN | DIASTOLIC BLOOD PRESSURE: 58 MMHG | SYSTOLIC BLOOD PRESSURE: 98 MMHG | HEART RATE: 87 BPM | BODY MASS INDEX: 25.18 KG/M2

## 2024-04-26 DIAGNOSIS — Z91.09 ENVIRONMENTAL ALLERGIES: ICD-10-CM

## 2024-04-26 DIAGNOSIS — Z87.42 HISTORY OF PCOS: ICD-10-CM

## 2024-04-26 DIAGNOSIS — Z00.00 ROUTINE GENERAL MEDICAL EXAMINATION AT A HEALTH CARE FACILITY: Primary | ICD-10-CM

## 2024-04-26 DIAGNOSIS — Z86.59 HISTORY OF ADHD: ICD-10-CM

## 2024-04-26 DIAGNOSIS — Z00.00 ENCOUNTER FOR SCREENING AND PREVENTATIVE CARE: Primary | ICD-10-CM

## 2024-04-26 DIAGNOSIS — Z00.00 ROUTINE MEDICAL EXAM: ICD-10-CM

## 2024-04-26 LAB
ALBUMIN SERPL BCP-MCNC: 4.2 G/DL (ref 3.5–5.2)
ALP SERPL-CCNC: 66 U/L (ref 55–135)
ALT SERPL W/O P-5'-P-CCNC: 21 U/L (ref 10–44)
ANION GAP SERPL CALC-SCNC: 9 MMOL/L (ref 8–16)
AST SERPL-CCNC: 15 U/L (ref 10–40)
BILIRUB SERPL-MCNC: 0.5 MG/DL (ref 0.1–1)
BUN SERPL-MCNC: 11 MG/DL (ref 6–20)
CALCIUM SERPL-MCNC: 9.8 MG/DL (ref 8.7–10.5)
CHLORIDE SERPL-SCNC: 107 MMOL/L (ref 95–110)
CHOLEST SERPL-MCNC: 148 MG/DL (ref 120–199)
CHOLEST/HDLC SERPL: 3 {RATIO} (ref 2–5)
CO2 SERPL-SCNC: 27 MMOL/L (ref 23–29)
CREAT SERPL-MCNC: 0.7 MG/DL (ref 0.5–1.4)
ERYTHROCYTE [DISTWIDTH] IN BLOOD BY AUTOMATED COUNT: 12.6 % (ref 11.5–14.5)
EST. GFR  (NO RACE VARIABLE): >60 ML/MIN/1.73 M^2
ESTIMATED AVG GLUCOSE: 94 MG/DL (ref 68–131)
GLUCOSE SERPL-MCNC: 96 MG/DL (ref 70–110)
HBA1C MFR BLD: 4.9 % (ref 4–5.6)
HCT VFR BLD AUTO: 44.4 % (ref 37–48.5)
HDLC SERPL-MCNC: 49 MG/DL (ref 40–75)
HDLC SERPL: 33.1 % (ref 20–50)
HGB BLD-MCNC: 14 G/DL (ref 12–16)
LDLC SERPL CALC-MCNC: 81.2 MG/DL (ref 63–159)
MCH RBC QN AUTO: 28 PG (ref 27–31)
MCHC RBC AUTO-ENTMCNC: 31.5 G/DL (ref 32–36)
MCV RBC AUTO: 89 FL (ref 82–98)
NONHDLC SERPL-MCNC: 99 MG/DL
OHS QRS DURATION: 94 MS
OHS QTC CALCULATION: 435 MS
PLATELET # BLD AUTO: 350 K/UL (ref 150–450)
PMV BLD AUTO: 9.9 FL (ref 9.2–12.9)
POTASSIUM SERPL-SCNC: 4 MMOL/L (ref 3.5–5.1)
PROT SERPL-MCNC: 7.7 G/DL (ref 6–8.4)
RBC # BLD AUTO: 5 M/UL (ref 4–5.4)
SODIUM SERPL-SCNC: 143 MMOL/L (ref 136–145)
TRIGL SERPL-MCNC: 89 MG/DL (ref 30–150)
TSH SERPL DL<=0.005 MIU/L-ACNC: 1.42 UIU/ML (ref 0.4–4)
WBC # BLD AUTO: 7.16 K/UL (ref 3.9–12.7)

## 2024-04-26 PROCEDURE — 97802 MEDICAL NUTRITION INDIV IN: CPT | Mod: S$GLB,,,

## 2024-04-26 PROCEDURE — 99999 PR PBB SHADOW E&M-EST. PATIENT-LVL I: CPT | Mod: PBBFAC,,,

## 2024-04-26 PROCEDURE — 93010 ELECTROCARDIOGRAM REPORT: CPT | Mod: S$GLB,,, | Performed by: INTERNAL MEDICINE

## 2024-04-26 PROCEDURE — 99395 PREV VISIT EST AGE 18-39: CPT | Mod: S$GLB,,, | Performed by: EMERGENCY MEDICINE

## 2024-04-26 PROCEDURE — 3044F HG A1C LEVEL LT 7.0%: CPT | Mod: CPTII,S$GLB,, | Performed by: EMERGENCY MEDICINE

## 2024-04-26 PROCEDURE — 80053 COMPREHEN METABOLIC PANEL: CPT | Performed by: EMERGENCY MEDICINE

## 2024-04-26 PROCEDURE — 3074F SYST BP LT 130 MM HG: CPT | Mod: CPTII,S$GLB,, | Performed by: EMERGENCY MEDICINE

## 2024-04-26 PROCEDURE — 93005 ELECTROCARDIOGRAM TRACING: CPT | Mod: S$GLB,,, | Performed by: EMERGENCY MEDICINE

## 2024-04-26 PROCEDURE — 80061 LIPID PANEL: CPT | Performed by: EMERGENCY MEDICINE

## 2024-04-26 PROCEDURE — 97750 PHYSICAL PERFORMANCE TEST: CPT | Mod: GP,S$GLB,, | Performed by: INTERNAL MEDICINE

## 2024-04-26 PROCEDURE — 85027 COMPLETE CBC AUTOMATED: CPT | Performed by: EMERGENCY MEDICINE

## 2024-04-26 PROCEDURE — 83036 HEMOGLOBIN GLYCOSYLATED A1C: CPT | Performed by: EMERGENCY MEDICINE

## 2024-04-26 PROCEDURE — 36415 COLL VENOUS BLD VENIPUNCTURE: CPT | Performed by: EMERGENCY MEDICINE

## 2024-04-26 PROCEDURE — 1159F MED LIST DOCD IN RCRD: CPT | Mod: CPTII,S$GLB,, | Performed by: EMERGENCY MEDICINE

## 2024-04-26 PROCEDURE — 3078F DIAST BP <80 MM HG: CPT | Mod: CPTII,S$GLB,, | Performed by: EMERGENCY MEDICINE

## 2024-04-26 PROCEDURE — 99999 PR PBB SHADOW E&M-EST. PATIENT-LVL III: CPT | Mod: PBBFAC,,, | Performed by: EMERGENCY MEDICINE

## 2024-04-26 PROCEDURE — 84443 ASSAY THYROID STIM HORMONE: CPT | Performed by: EMERGENCY MEDICINE

## 2024-04-26 PROCEDURE — 3008F BODY MASS INDEX DOCD: CPT | Mod: CPTII,S$GLB,, | Performed by: EMERGENCY MEDICINE

## 2024-04-26 RX ORDER — METFORMIN HYDROCHLORIDE 500 MG/1
500 TABLET, EXTENDED RELEASE ORAL
COMMUNITY
Start: 2024-04-05

## 2024-04-26 RX ORDER — CLASCOTERONE 1 G/100G
CREAM TOPICAL
COMMUNITY
Start: 2024-04-22

## 2024-04-26 RX ORDER — ADAPALENE GEL USP, 0.3% 3 MG/G
GEL TOPICAL
COMMUNITY
Start: 2024-04-22

## 2024-04-26 NOTE — PROGRESS NOTES
Pt. Has no significant cardiovascular or pulmonary history.    Physical Limitations:  Patient herniated a disc at L5/S1 while dead lifting when she was 20 years old.  Patient current has right quad tendonitis that limits her from running and performing single leg activities.  Patient is currently in maintenance physical therapy for her back and quad.      Current exercise routine:  Patient currently attends a 60 minute Cross Fit class, which consists of full body resistance training exercises, 20 minutes of high intensity aerobic interval training, and stretching.  Patient attends a Pilates or Yoga class, 1-2 days a week.  Patient practices stretching and mobility exercises along with her prescribed physical therapy exercises, 3 days a week.     Goals:  Patient would like to better her body composition and decrease visceral fat  Notes:  Patient was very friendly and engaged.  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  Patient was recently diagnosed with PCOS by her OBGYN after seeing multiple [facility] physicians who dismissed her concerns.  Patient noted irregular and painful periods along with sudden unexplained weight gain as her initial symptoms.  She started Metformin to decrease insulin resistance a few weeks ago and will see and Endocrinologist in July.  Patient expressed frustration over her history with [facility] and is only back because [facility] is covered through her employer.  We discussed the importance of balancing her hormones to address the unexplained weight gain.  We reviewed a modified push-up exercise to increase her range of motion.  Patient has been a Cross Fit athlete since 16 and is clearly dedicated to her exercise routine and health.  Patient asked questions and was receptive to all recommendations.      The fitness evaluation results are as follows:    D.O.S. 4/26/2024 3/3/2023   Height (in): 69 69    Weight (lbs): 169.8 164.3   BMI: 25.313013 24.3136   Body Fat (%): 38.40 37.10   Waist (cm): 85.5 79   RBP (mmHg): 100/72 114/74   RHR (bpm): 76 78    Strength Dominant (Lbs): 70 66    Strength Non Dominant (Lbs): 63 57   Push-up Assessment: 29 27   Curl-up Assessment: 35 35   Flexibility Testing (cm): 31 29   REE (kcals): 1395 1383       Age/gender stratified assessment:    Resting BP: Within Normal Limits   Body Fat %: Poor   Waist Circumfernece: Low Risk    Strength Dominant: 50th    Strength Non Dominant: 50th   Upper Body Endurance: Very Good   Abdominal Endurance: Average   Lower body Flexibiltiy: Fair       Recommended fitness guidelines:    -150 minutes of moderate intensity aerobic exercise per week or 75 minutes of vigorous intensity aerobic exercise per week.    -2 to 4 days per week of resistance training for each muscle group.      -Daily stretching with a hold of at least 30 seconds per muscle group.

## 2024-04-26 NOTE — PROGRESS NOTES
[facility]      TODAY'S Date 4/26/2024  Patient ID: Precious Perez is a 28 y.o. female   MRN: [id number]  Primary Care Physician (PCP):  aPttie Pina DO    SUBJECTIVE     Chief Complaint:   Chief Complaint   Patient presents with    [facility]      HPI:   Reviewed medical, surgical, social and family history, medications, appropriate preventive health screenings, as well as vaccination history. Updates as noted below or in assessment and plan.    This is a very pleasant 28 y.o. nonsmoking female with PMHx environmental allergies, ADHD, PCOS. She is a new patient to me, seeking her annual exam in [facility] () through her employer, [employer]. The patient is currently in good health and has seen improvement since starting medication for PCOS. Per patient, a pelvic ultrasound revealed multiple ovarian cysts, but no masses. She recently changed healthcare providers and is now seeking care outside of the [facility] system, with the exception of . The patient has been in a supportive romantic relationship for two years. In her free time, she enjoys walking and maintains an active lifestyle with CrossFit, yoga, Pilates, swimming, biking, and resistance training. Her sleep schedule is consistent, with a bedtime between 10-11:00 p.m. and wake-up time between 6-7:00 a.m. She usually feels well-rested and has no trouble falling asleep. 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 In terms of diet, the patient typically consumes eggs, avocado, toast, and blueberries for breakfast. Lunch usually consists of chicken, brown rice, and green beans, while dinner is a home-cooked meal such as rotisserie chicken, chickpea pasta with sauce and kale. The patient snacks on fruit, nuts, and proteins. She drinks 4-6 cups of water and 1-2 cups of coffee daily, with her last coffee usually  in the early afternoon. She occasionally has tea and a glass of wine every 1-2 weeks. She also takes MVI, zinc, magnesium, and fish oil supplements. Currently, she is undergoing allergy shots and is approaching maintenance dosage. There is no report of hearing loss, tinnitus, noise exposure, dizziness, recent trauma, IV drug use, tingling, clumsiness, numbness, fever/chills, n/v/d/c, abdominal pain, muscle aches, or loss of bowel or bladder control. Her last normal menstrual period was on 04/24/24, and she has a menstrual cycle every four months.     SCREENING:   Mammogram:    n/a as < 30 years old and + family history. Patient is BRCA negative   Cervical cancer:   UTD      Component Oct 2021   PAP DIAGNOSIS Comment   Comment: NEGATIVE FOR INTRAEPITHELIAL LESION OR MALIGNANCY.        DEXA:     N/a as < 70 years old and no clinical risk factors for fracture independent of bone mineral density   Colonoscopy:    n/a as < 45 years old and no family history  Depression:    Negative currently  Anxiety:    Negative currently  Eye Exam:    UTD, wears corrective lens  Dental Exam:    Routine q 6 months   Ear Exam:    No complaints  Skin:     Routinely uses sunscreen. Negative for recent sunburn  Negative for moderate sun exposure in the past, several blistering sunburns, mole removal.  No history of tanning bed use or melanoma in first degree relatives.  Sex:     Monogamous relationship, contraception none. States HIV negative and with same partner since checked  Transportation safety:   Uses restraint consistently, does not drink alcohol before or while driving  Firearm safety:   Stores gun safely  Tobacco use:    None reported    A review of medical records indicates Specialists:   Allergy and Immunology - Luciana Perez MD  Psychiatry - Kareen Hernandez MD  Otolaryngology - Jessy Steen MD  Obstetrics and Gynecology - Jeansonne, Julie R., MD  Dermatology - Jenifer Shoemaker MD  Obstetrics and Gynecology - Samantha  Belkis ARANDA PA-C for increased risk of breast cancer    Immunization History   Administered Date(s) Administered    COVID-19, MRNA, LN-S, PF (Pfizer) (Purple Cap) 03/17/2021, 04/14/2021, 12/21/2021    DTaP 1996, 01/24/1997, 03/31/1997, 10/06/1997, 07/14/1998    HIB 01/24/1997, 03/31/1997, 10/06/1997    HPV 9-Valent 06/16/2010, 08/04/2010, 01/14/2011    Hep B / HiB 1996    Hepatitis A 07/14/1998    Hepatitis A, Pediatric/Adolescent, 2 Dose 07/10/1997    Hepatitis B 03/31/1997    Hepatitis B, Pediatric/Adolescent 1996    IPV 1996, 01/24/1997, 03/31/1997, 10/06/1997    Influenza 12/27/2005    Influenza - Quadrivalent - MDCK - PF 11/02/2020    Influenza - Quadrivalent - PF *Preferred* (6 months and older) 12/21/2021    MMR 10/06/1997, 08/02/2001    Meningococcal Conjugate (MCV4P) 07/14/1998    Td - Not Adsorbed (Adult) 08/02/2001    Tetanus 01/31/2018    Typhoid 04/06/2016    Varicella 07/10/1997, 12/19/1997    Yellow Fever 09/26/2016     Past Medical History:   Diagnosis Date    Acne     Anxiety disorder, unspecified     Back injury     Depression     Environmental allergies      Past Surgical History:   Procedure Laterality Date    SEPTOPLASTY, NOSE, WITH NASAL TURBINATE REDUCTION      WISDOM TOOTH EXTRACTION         Family History   Problem Relation Name Age of Onset    Diabetes type II Father father     Breast cancer Maternal Grandmother mgm         dx 50s (uni/bilat?), thinks recurred in (same?) breast and distantly    Cancer Maternal Grandfather mgf         in prostate & liver (dx 50s/60s?) (+occupat. exp.)    Breast cancer Paternal Grandmother pgm         (dx age? unilat?)    Dementia Paternal Grandmother pgm     Diabetes Paternal Grandfather pgf     Diabetes type II Paternal Grandfather pgf     Prostate cancer Maternal Uncle Teb         dx 50s, metastasized    Other Maternal Uncle Teb         suspected colon polyps    ADD / ADHD Other brother     Breast cancer Other Katia         (dx age?)     Breast cancer Other Olive         (dx ~50?)    Breast cancer Maternal Aunt      Amblyopia Neg Hx      Blindness Neg Hx      Cataracts Neg Hx      Glaucoma Neg Hx      Hypertension Neg Hx      Macular degeneration Neg Hx      Retinal detachment Neg Hx      Strabismus Neg Hx      Stroke Neg Hx      Thyroid disease Neg Hx         Social History     Tobacco Use    Smoking status: Never    Smokeless tobacco: Never   Substance Use Topics    Alcohol use: Yes    Drug use: Not Currently     Past Medical, Surgical and Social history reviewed and verified by me.   Review of patient's allergies indicates:   Allergen Reactions    Amoxicillin Other (See Comments)     Yeast infections       Current Outpatient Medications on File Prior to Visit   Medication Sig Dispense Refill    adapalene 0.3 % gel Apply topically.      azelastine-fluticasone (DYMISTA) 137-50 mcg/spray Spry nassal spray USE 1 SPRAY(S) IN EACH NOSTRIL TWICE DAILY FOR 30 DAYS      desloratadine (CLARINEX) 5 mg tablet TAKE 1 TABLET BY MOUTH ONCE DAILY FOR 30 DAYS      dexmethylphenidate (FOCALIN) 5 MG tablet Take 5 mg by mouth once daily.      metFORMIN (GLUCOPHAGE-XR) 500 MG ER 24hr tablet Take 500 mg by mouth.      montelukast (SINGULAIR) 5 MG chewable tablet CHEW AND SWALLOW 1 TABLET BY MOUTH ONCE DAILY AS DIRECTED      sulfacetamide sodium-sulfur 10-5 % (w/w) Clsr Wash face qday - bid 360 g 3    WINLEVI 1 % Crea Apply topically.      [DISCONTINUED] olopatadine (PAZEO) 0.7 % Drop 1 drop into affected eye       No current facility-administered medications on file prior to visit.       Review of Systems as per HPI  Review of Systems   HENT:  Positive for congestion.    Respiratory:  Negative for cough, hemoptysis, sputum production, shortness of breath, wheezing and stridor.    Cardiovascular:  Negative for chest pain, palpitations, orthopnea, claudication, leg swelling and PND.   Neurological:  Negative for dizziness, tingling, tremors, sensory change, speech  "change, focal weakness, seizures, loss of consciousness, weakness and headaches.   Constitutional: Negative for activity change, appetite change, fatigue, diaphoresis, chills and fever.   Respiratory: Negative for apnea, choking, chest tightness and wheezing.  Genitourinary: Negative for hematuria, flank pain, frequency and dysuria.   Skin: Negative for color change, pallor, rash and wound. Patient denies concerning moles or lesions.  Psychiatric/Behavioral: Negative for agitation, behavioral problems, confusion, decreased concentration and dysphoric mood.     All other systems reviewed and are negative.    OBJECTIVE     PHYSICAL EXAM  Vitals:    04/26/24 1040   BP: (!) 98/58   Pulse: 87   SpO2: 100%   Weight: 77.1 kg (170 lb)   Height: 5' 9" (1.753 m)     Vital Signs (Most Recent):  Pulse: 87 (04/26/24 1040)  BP: (!) 98/58 (04/26/24 1040)  SpO2: 100 % (04/26/24 1040)   Weight:   Wt Readings from Last 1 Encounters:   04/26/24 77.1 kg (170 lb)     Body mass index is 25.1 kg/m².      Vital signs and nursing assessment noted:  Low normal blood pressure    GEN:   NAD, A & Ox3, atraumatic, well appearing, nontoxic appearing  HEENT:  PERRLA, EOMI, moist membranes, nl conjunctiva, no scleral icterus, no nystagmus, no nodes/nodules, soft, supple, FROM, no trachial deviation, nexus negative, normal TMs bilaterally, normal pharynx, + nasal congestion  CV:   RRR no m/r/g, 2+ radial pulses, <2sec cap refill, no obvious JVD  RESP:  CTA B, no w/r/r, equal and bilateral chest rise, no respiratory distress  ABD:   soft, Nontender, Nondistended, +BS, no guarding/rebound  :   Deferred  BACK:  FROM, no midline tenderness, no paraspinal tenderness  EXT:   FROM, JOSE x 4, no swelling, no edema, no calf tenderness, no bony tenderness, no warmth or redness, no crepitus, no obvious deformity  LYMPH:  no gross adenopathy  NEURO:  GCS 15, CN II-XII grossly intact, no obvious motor/sensory deficit, no tremor, negative Romberg,  nl " gait/coordination  PSYCH:   Cooperative, no SI/HI, no anxiety, nl mood/affect, nl judgement/thought process  SKIN:  Warm, dry, intact, no rashes/lesions or masses, nl color, no pallor    Tests    EKG  Reviewed and independently interpreted:  No STEMI  NSR with HR 72  Nl conduction, nl intervals  Nl ST and T wave   No ectopy, Nl axis      Labs reviewed and independently interpreted. Imaging studies reviewed.   Recent Results (from the past 2016 hour(s))   Comprehensive metabolic panel    Collection Time: 04/26/24  8:04 AM   Result Value Ref Range    Sodium 143 136 - 145 mmol/L    Potassium 4.0 3.5 - 5.1 mmol/L    Chloride 107 95 - 110 mmol/L    CO2 27 23 - 29 mmol/L    Glucose 96 70 - 110 mg/dL    BUN 11 6 - 20 mg/dL    Creatinine 0.7 0.5 - 1.4 mg/dL    Calcium 9.8 8.7 - 10.5 mg/dL    Total Protein 7.7 6.0 - 8.4 g/dL    Albumin 4.2 3.5 - 5.2 g/dL    Total Bilirubin 0.5 0.1 - 1.0 mg/dL    Alkaline Phosphatase 66 55 - 135 U/L    AST 15 10 - 40 U/L    ALT 21 10 - 44 U/L    eGFR >60.0 >60 mL/min/1.73 m^2    Anion Gap 9 8 - 16 mmol/L   CBC Without Differential    Collection Time: 04/26/24  8:04 AM   Result Value Ref Range    WBC 7.16 3.90 - 12.70 K/uL    RBC 5.00 4.00 - 5.40 M/uL    Hemoglobin 14.0 12.0 - 16.0 g/dL    Hematocrit 44.4 37.0 - 48.5 %    MCV 89 82 - 98 fL    MCH 28.0 27.0 - 31.0 pg    MCHC 31.5 (L) 32.0 - 36.0 g/dL    RDW 12.6 11.5 - 14.5 %    Platelets 350 150 - 450 K/uL    MPV 9.9 9.2 - 12.9 fL   Lipid panel    Collection Time: 04/26/24  8:04 AM   Result Value Ref Range    Cholesterol 148 120 - 199 mg/dL    Triglycerides 89 30 - 150 mg/dL    HDL 49 40 - 75 mg/dL    LDL Cholesterol 81.2 63.0 - 159.0 mg/dL    HDL/Cholesterol Ratio 33.1 20.0 - 50.0 %    Total Cholesterol/HDL Ratio 3.0 2.0 - 5.0    Non-HDL Cholesterol 99 mg/dL   Hemoglobin A1c    Collection Time: 04/26/24  8:04 AM   Result Value Ref Range    Hemoglobin A1C 4.9 4.0 - 5.6 %    Estimated Avg Glucose 94 68 - 131 mg/dL   TSH    Collection Time:  24  8:04 AM   Result Value Ref Range    TSH 1.423 0.400 - 4.000 uIU/mL   EKG 12-lead    Collection Time: 24  9:47 AM   Result Value Ref Range    QRS Duration 94 ms    [facility] QTC Calculation 435 ms       Latest Reference Range & Units 22 11:42   Hepatitis C Ab Non-reactive  Non-reactive     CT MEDTRONIC SINUSES WITHOUT 2023 Impression:   Minimal mucosal membrane thickening posterior ethmoid air cells on the right.   Prominent leftward deviation of the nasal septum.    XR CHEST PA AND LATERAL 2023 Impression: No acute abnormality.  ASSESSMENT:     1. Encounter for screening and preventative care    2. History of PCOS    3. History of ADHD    4. Environmental allergies       Health Maintenance   Topic Date Due    Influenza Vaccine (1) 2023    COVID-19 Vaccine (4 -  season) 2023    Mammogram 2026    Pap Smear  2027    TETANUS VACCINE  2028    Hepatitis C Screening  Completed    Lipid Panel  Completed     28 y.o. nonsmoking  female PMHx PCOS on metformin and topical spironolactone, environmental allergies on desloratadine, azelastine-fluticasone, and montelukast, ADHD on dexmethylphenidate, deviated septum status post septoplasty with nasal turbinate reduction,  family history of breast cancer presents for annual exam in [facility].  Patient is in a normal state of health and has had some improvement after starting medications for PCOS.  She reports that she alyssa with her anxiety very well.  Functionally, the patient does not report limitations due to her symptoms.  Except for nasal congestion, exam is relatively benign.  No obvious skin lesions suspicious for malignancy noted. Home medications and health maintenance reviewed.      MDM  Reviewed: previous chart, nursing note and vitals  Reviewed previous: labs, ECG, x-ray and CT scan (CT MEDTRONIC SINUSES WITHOUT 2023 Impression:  Minimal mucosal membrane thickening posterior ethmoid air  cells on the right.  Prominent leftward deviation of the nasal septum.)  Interpretation: labs (relatively unremarkable Lipid Panel, CMP, CBC, TSH, HgA1C, Hep C ab)      PLAN:   Periodic health maintenance visits annually or sooner with concerns.   Vaccinations to be obtained at local pharmacy or with medical provider patient's choosing   Routine labs CMP, CBC, Lipid, HgA1C, TSH  Continue current medications.  Encouraged healthy eating including Omega/fish oil 1000mg, weight management and meditation possibly in the form of yoga.   Recommend a high fiber, lean protein diet that is high in complex carbohydrates such as non-starchy vegetables and whole grains.   Recommend 150 minutes of moderate-intensity physical activity and 2 days of muscle strengthening activity weekly.  Recommend daily sun protection/avoidance, use of at least SPF 30, broad spectrum sunscreen (OTC drug), and routine physician surveillance to optimize early detection.   Follow up with dermatology.  Follow up with Allergist.    The results of physical exam findings, labs, and imaging were reviewed with the patient. Management of above assessments/visit diagnoses was discussed with patient. Precautions for return discussed at length. Patient was given ample time for questions. All questions asked and answered to the satisfaction of the patient. Patient is in agreement with the above and verbalized understanding. Total time spent caring for the patient today was 35 minutes. This includes time spent before the visit reviewing the chart, time spent during the visit, and time spent after the visit on documentation.    Cony Umana MD  [facility]    Disclaimer: This document was created using voice recognition software (M*Modal Fluency Direct). Although it may be edited, this document may contain errors related to incorrect recognition of the spoken word. Please contact the physician if clarification is  needed.    The morningGood morning so he is  do labs today his AH state last night he wants bleeding

## 2024-04-26 NOTE — PROGRESS NOTES
"Nutrition Assessment  Session Time:  45 minutes      Client name:  Precious Perez  :  1995  Age:  28 y.o.  Gender:  female    Client states:  A pleasant Ms. Perez is here for her Executive Health physical. Patient is an  for [employer] and is also in graduate school. She lives with her partner, who shares in the cooking. Ms. Perez has a history of allergies and ADHD, and she was recently diagnosed with PCOS and started on Metformin to better manage her blood glucose. She is particularly concerned because many PCOS patient develop diabetes, and her family already has a history of diabetes. She has experienced some nausea from the Metformin and is hoping it will resolved. She sleeps 7-8 hours regularly. Her labs were still processing and we were unable to review them, upon review they are unremarkable. She started working with a nutritionist  about 8 months ago and, by tracking her nutritional intake, discovered that she was eating too litlle protein, particularly early in the day. On the advice of her , she quit eating oatmeal in the mornings and now eats egg whites, vegetables and avocado. She is more focused on eating high fiber carbohydrates and eating as little highly processed foods as possible. Her diet is heart healthy higher in protein and lower in carbohydrates, with mostly high fiber carbohydrates. She makes it a point to have regular meals because her ADHD medication will cause her to not feel hunger signals at times. She continues to be active with a mixture of Crossfit, pilates and spin-bike for at least 30 minutes on 4-5 days per week.    Anthropometrics  Height:  69"     Weight:  169.8 lbs  BMI:  25.1  % Body Fat:  38.4%    Clinical Signs/Symptoms  N/V/D:  none  Appetite:  good       Past Medical History:   Diagnosis Date    Acne     Back injury     Environmental allergies        Past Surgical History:   Procedure Laterality Date    WISDOM TOOTH EXTRACTION   "       Medications    has a current medication list which includes the following prescription(s): azelastine-fluticasone, desloratadine, dexmethylphenidate, montelukast, pazeo, and sulfacetamide sodium-sulfur.    Vitamins, Minerals, and/or Supplements:  MVI, zinc, magnesium, fish oil      Food/Medication Interactions:  Reviewed     Food Allergies or Intolerances:  lactose intolerant    Social History    Marital status:  Single  Employment:  [employer]    Social History     Tobacco Use    Smoking status: Never    Smokeless tobacco: Never   Substance Use Topics    Alcohol use: Not on file        Lab Reports   Sodium   Date Value Ref Range Status   04/26/2024 143 136 - 145 mmol/L Final     Potassium   Date Value Ref Range Status   04/26/2024 4.0 3.5 - 5.1 mmol/L Final     Chloride   Date Value Ref Range Status   04/26/2024 107 95 - 110 mmol/L Final     CO2   Date Value Ref Range Status   04/26/2024 27 23 - 29 mmol/L Final     Glucose   Date Value Ref Range Status   04/26/2024 96 70 - 110 mg/dL Final     BUN   Date Value Ref Range Status   04/26/2024 11 6 - 20 mg/dL Final     Creatinine   Date Value Ref Range Status   04/26/2024 0.7 0.5 - 1.4 mg/dL Final     Calcium   Date Value Ref Range Status   04/26/2024 9.8 8.7 - 10.5 mg/dL Final     Total Protein   Date Value Ref Range Status   04/26/2024 7.7 6.0 - 8.4 g/dL Final     Albumin   Date Value Ref Range Status   04/26/2024 4.2 3.5 - 5.2 g/dL Final     Total Bilirubin   Date Value Ref Range Status   04/26/2024 0.5 0.1 - 1.0 mg/dL Final     Comment:     For infants and newborns, interpretation of results should be based  on gestational age, weight and in agreement with clinical  observations.    Premature Infant recommended reference ranges:  Up to 24 hours.............<8.0 mg/dL  Up to 48 hours............<12.0 mg/dL  3-5 days..................<15.0 mg/dL  6-29 days.................<15.0 mg/dL       Alkaline Phosphatase   Date Value Ref Range Status   04/26/2024 66 76 - 272  U/L Final     AST   Date Value Ref Range Status   04/26/2024 15 10 - 40 U/L Final     ALT   Date Value Ref Range Status   04/26/2024 21 10 - 44 U/L Final     Anion Gap   Date Value Ref Range Status   04/26/2024 9 8 - 16 mmol/L Final      Lab Results   Component Value Date    WBC 7.16 04/26/2024    HGB 14.0 04/26/2024    HCT 44.4 04/26/2024    MCV 89 04/26/2024     04/26/2024        Lab Results   Component Value Date    CHOL 148 04/26/2024     Lab Results   Component Value Date    HDL 49 04/26/2024     Lab Results   Component Value Date    LDLCALC 81.2 04/26/2024     Lab Results   Component Value Date    TRIG 89 04/26/2024     Lab Results   Component Value Date    CHOLHDL 33.1 04/26/2024     Lab Results   Component Value Date    HGBA1C 4.9 04/26/2024     BP Readings from Last 1 Encounters:   03/03/23 110/67       Food History  Breakfast:  1 cup coffee with almond milk; a cup of egg whites, sauteed vegetables and avocado (about 35g protein) OR sometimes cashew yogurt with hemp and flax seeds  Mid-morning Snack:  noen  Lunch:  chicken, snap peas and brown rice OR something similar  Mid-afternoon Snack:  sometimes Barbell protein bar  Dinner:  Banza pasta with Ordoñez's sauce and kale and some [employer] rotisserie chicken OR some other protein with vegetables and a high fiber carb  Snack:  not usually, sometimes a few squares of dark chocolate  *Fluid intake:  coffee, tea, kombucha, sparkling water  Alcohol: a glass of wine every 1-2 weeks    Exercise History:  Patient currently attends a 60 minute Cross Fit class, which consists of full body resistance training exercises, 20 minutes of high intensity aerobic interval training, and stretching.  Patient attends a Pilates or Yoga class, 1-2 days a week.  Patient practices stretching and mobility exercises along with her prescribed physical therapy exercises, 3 days a week.     Cultural/Spiritual/Personal Preferences:  None identified    Support System:  significant other  and friends    State of Change:  Action    Barriers to Change:  none    Diagnosis    Involuntary weight gain related to PCOS as evidenced by sudden 20 lb weight gain about 2 years ago.    Intervention    RMR (Method:  InBody):  1395 kcal  Activity Factor:  1.3    SONAL:  1814 kcal    Goals:  1.  Continue current heart healthy, lower carbohydrate diet.  2.  Continue current physical regimen.    Nutrition Education  The following education was provided to the patient:  Complimented patient on dietary compliance/modifications and resulting health improvements.  Complimented patient on physical activity efforts.  *Lab results were pending at time of consult and so, not discussed with patient.  Discussed recommended fiber intake and food sources of such.  Discussed [place] client resources (may include but not limited to Eat Fit Shopping List, Fueling Well on the Go, and Meal Planning Guide).  Discussed goal setting.  Provided ongoing support, encouragement, and guidance toward improved health efforts.    Patient verbalized understanding of nutrition education and recommendations received.    Handouts Provided  Meal Planning Guide  Eat Fit Shopping List  Fueling Well On-The-Go    Monitoring/Evaluation    Monitor the following:  Weight  BMI  % Body Fat  Caloric intake  Labs:  HbA1c    Follow Up Plan:  Communication with referring healthcare provider is unnecessary at this time as patient presented as part of annual wellness exam.  However, will follow up with patient in 1-2 years.

## 2025-05-27 DIAGNOSIS — Z00.00 ROUTINE GENERAL MEDICAL EXAMINATION AT A HEALTH CARE FACILITY: Primary | ICD-10-CM

## 2025-07-08 ENCOUNTER — CLINICAL SUPPORT (OUTPATIENT)
Dept: INTERNAL MEDICINE | Facility: CLINIC | Age: 30
End: 2025-07-08
Payer: COMMERCIAL

## 2025-07-08 ENCOUNTER — HOSPITAL ENCOUNTER (OUTPATIENT)
Dept: CARDIOLOGY | Facility: CLINIC | Age: 30
Discharge: HOME | End: 2025-07-08
Payer: COMMERCIAL

## 2025-07-08 ENCOUNTER — OFFICE VISIT (OUTPATIENT)
Dept: INTERNAL MEDICINE | Facility: CLINIC | Age: 30
End: 2025-07-08
Payer: COMMERCIAL

## 2025-07-08 VITALS
DIASTOLIC BLOOD PRESSURE: 64 MMHG | OXYGEN SATURATION: 97 % | HEIGHT: 69 IN | SYSTOLIC BLOOD PRESSURE: 102 MMHG | WEIGHT: 166.88 LBS | BODY MASS INDEX: 24.72 KG/M2 | HEART RATE: 65 BPM

## 2025-07-08 DIAGNOSIS — Z00.00 ANNUAL PHYSICAL EXAM: Primary | ICD-10-CM

## 2025-07-08 DIAGNOSIS — E28.2 PCOS (POLYCYSTIC OVARIAN SYNDROME): ICD-10-CM

## 2025-07-08 DIAGNOSIS — Z00.00 ROUTINE GENERAL MEDICAL EXAMINATION AT A HEALTH CARE FACILITY: Primary | ICD-10-CM

## 2025-07-08 DIAGNOSIS — Z00.00 ROUTINE GENERAL MEDICAL EXAMINATION AT A HEALTH CARE FACILITY: ICD-10-CM

## 2025-07-08 DIAGNOSIS — F90.9 ATTENTION DEFICIT HYPERACTIVITY DISORDER (ADHD), UNSPECIFIED ADHD TYPE: ICD-10-CM

## 2025-07-08 DIAGNOSIS — L70.9 ACNE, UNSPECIFIED ACNE TYPE: ICD-10-CM

## 2025-07-08 DIAGNOSIS — Z91.09 ENVIRONMENTAL ALLERGIES: ICD-10-CM

## 2025-07-08 PROBLEM — F41.8 ANXIETY ASSOCIATED WITH DEPRESSION: Status: RESOLVED | Noted: 2024-10-30 | Resolved: 2025-07-08

## 2025-07-08 PROBLEM — F41.8 ANXIETY ASSOCIATED WITH DEPRESSION: Status: ACTIVE | Noted: 2024-10-30

## 2025-07-08 LAB
ALBUMIN SERPL BCP-MCNC: 4.3 G/DL (ref 3.5–5.2)
ALP SERPL-CCNC: 60 UNIT/L (ref 40–150)
ALT SERPL W/O P-5'-P-CCNC: 17 UNIT/L (ref 10–44)
ANION GAP (OHS): 6 MMOL/L (ref 8–16)
AST SERPL-CCNC: 13 UNIT/L (ref 11–45)
BILIRUB SERPL-MCNC: 0.7 MG/DL (ref 0.1–1)
BUN SERPL-MCNC: 12 MG/DL (ref 6–20)
CALCIUM SERPL-MCNC: 9.4 MG/DL (ref 8.7–10.5)
CHLORIDE SERPL-SCNC: 108 MMOL/L (ref 95–110)
CHOLEST SERPL-MCNC: 162 MG/DL (ref 120–199)
CHOLEST/HDLC SERPL: 3.1 {RATIO} (ref 2–5)
CO2 SERPL-SCNC: 27 MMOL/L (ref 23–29)
CREAT SERPL-MCNC: 0.7 MG/DL (ref 0.5–1.4)
EAG (OHS): 88 MG/DL (ref 68–131)
ERYTHROCYTE [DISTWIDTH] IN BLOOD BY AUTOMATED COUNT: 12.6 % (ref 11.5–14.5)
GFR SERPLBLD CREATININE-BSD FMLA CKD-EPI: >60 ML/MIN/1.73/M2
GLUCOSE SERPL-MCNC: 93 MG/DL (ref 70–110)
HBA1C MFR BLD: 4.7 % (ref 4–5.6)
HCT VFR BLD AUTO: 40.9 % (ref 37–48.5)
HDLC SERPL-MCNC: 52 MG/DL (ref 40–75)
HDLC SERPL: 32.1 % (ref 20–50)
HGB BLD-MCNC: 13.2 GM/DL (ref 12–16)
HIV 1+2 AB+HIV1 P24 AG SERPL QL IA: NORMAL
LDLC SERPL CALC-MCNC: 93.6 MG/DL (ref 63–159)
MCH RBC QN AUTO: 28.1 PG (ref 27–31)
MCHC RBC AUTO-ENTMCNC: 32.3 G/DL (ref 32–36)
MCV RBC AUTO: 87 FL (ref 82–98)
NONHDLC SERPL-MCNC: 110 MG/DL
OHS QRS DURATION: 94 MS
OHS QTC CALCULATION: 453 MS
PLATELET # BLD AUTO: 332 K/UL (ref 150–450)
PMV BLD AUTO: 9.4 FL (ref 9.2–12.9)
POTASSIUM SERPL-SCNC: 4.4 MMOL/L (ref 3.5–5.1)
PROT SERPL-MCNC: 7.5 GM/DL (ref 6–8.4)
RBC # BLD AUTO: 4.7 M/UL (ref 4–5.4)
SODIUM SERPL-SCNC: 141 MMOL/L (ref 136–145)
TRIGL SERPL-MCNC: 82 MG/DL (ref 30–150)
TSH SERPL-ACNC: 1.82 UIU/ML (ref 0.4–4)
WBC # BLD AUTO: 8.41 K/UL (ref 3.9–12.7)

## 2025-07-08 PROCEDURE — 85027 COMPLETE CBC AUTOMATED: CPT

## 2025-07-08 PROCEDURE — 99999 PR PBB SHADOW E&M-EST. PATIENT-LVL I: CPT | Mod: PBBFAC,,,

## 2025-07-08 PROCEDURE — 97750 PHYSICAL PERFORMANCE TEST: CPT | Mod: S$GLB,,, | Performed by: INTERNAL MEDICINE

## 2025-07-08 PROCEDURE — 84443 ASSAY THYROID STIM HORMONE: CPT

## 2025-07-08 PROCEDURE — 87389 HIV-1 AG W/HIV-1&-2 AB AG IA: CPT

## 2025-07-08 PROCEDURE — 84478 ASSAY OF TRIGLYCERIDES: CPT

## 2025-07-08 PROCEDURE — 84075 ASSAY ALKALINE PHOSPHATASE: CPT

## 2025-07-08 PROCEDURE — 99999 PR PBB SHADOW E&M-EST. PATIENT-LVL III: CPT | Mod: PBBFAC,,, | Performed by: STUDENT IN AN ORGANIZED HEALTH CARE EDUCATION/TRAINING PROGRAM

## 2025-07-08 PROCEDURE — 83036 HEMOGLOBIN GLYCOSYLATED A1C: CPT

## 2025-07-08 PROCEDURE — 97802 MEDICAL NUTRITION INDIV IN: CPT | Mod: S$GLB,,, | Performed by: DIETITIAN, REGISTERED

## 2025-07-08 PROCEDURE — 93005 ELECTROCARDIOGRAM TRACING: CPT | Mod: S$GLB,,, | Performed by: STUDENT IN AN ORGANIZED HEALTH CARE EDUCATION/TRAINING PROGRAM

## 2025-07-08 PROCEDURE — 93010 ELECTROCARDIOGRAM REPORT: CPT | Mod: S$GLB,,, | Performed by: INTERNAL MEDICINE

## 2025-07-08 RX ORDER — BUPROPION HYDROCHLORIDE 75 MG/1
75 TABLET ORAL
COMMUNITY
Start: 2025-04-16

## 2025-07-08 RX ORDER — SPIRONOLACTONE 50 MG/1
50 TABLET, FILM COATED ORAL 2 TIMES DAILY WITH MEALS
COMMUNITY
Start: 2024-09-05 | End: 2025-07-08

## 2025-07-08 NOTE — PROGRESS NOTES
Pt. Has no significant cardiovascular or pulmonary history.    Physical Limitations:  Patient herniated a disc at L5/S1 while dead lifting when she was 20 years old. She has right quad tendonitis that limits her from heavy lower body loading.      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  When at home, patient climbs the stair stepper or walks on the treadmill at an incline for 15 minutes followed by full-body resistance training exercises, 2-3 days a week, practices Pilates or Yoga, 2-3 days a week and cycles on the peloton for 30 minutes, 1-2 days a week.      Goals:  Patient would like to better her body composition   Notes:  Patient was friendly and engaged.  She has PCOS for which she takes Metformin and Wellbutrin.  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  She plans to join a gym with strength training classes when she moves.  We reviewed the habit stacking method to easily add stretching into her daily routine.  Patient asked questions and was receptive to all recommendations.      The fitness evaluation results are as follows:    D.O.S. 7/8/2025 4/26/2024 3/3/2023   Height (in): 69 69 69   Weight (lbs): 164.8 169.8 164.3   BMI: 24.327757 25.977881 24.3136   Body Fat (%): 37.30 38.40 37.10   Waist (cm): 85 85.5 79   RBP (mmHg): 100/58 100/72 114/74   RHR (bpm): 86 76 78    Strength Dominant (Lbs): 70 70 66    Strength Non Dominant (Lbs): 66 63 57   Push-up Assessment: 20 29 27   Curl-up Assessment: 33 35 35   Flexibility Testing (cm): 26 31 29   REE (kcals): 1382 1395 1383       Age/gender stratified assessment:    Resting BP: Within Normal  Limits   Body Fat %: Poor   Waist Circumfernece: Low Risk    Strength Dominant: 50th    Strength Non Dominant: 50th   Upper Body Endurance: Good   Abdominal Endurance: Average   Lower body Flexibiltiy: Needs Improvement       Recommended fitness guidelines:    -150 minutes of moderate intensity aerobic exercise per week or 75 minutes of vigorous intensity aerobic exercise per week.  Add more cardio exercise while off shore.      -2 to 4 days per week of resistance training for each muscle group.  Make sure you are keeping your body challenged and progressing your strength training routine by adding sets, repetitions, weight, time under tension, or switching up an exercise about every 6-8 weeks or once an exercise starts to feel easy for you.    -Daily stretching with a hold of at least 30 seconds per muscle group.  Practice the seated hamstring stretch, demonstrated during the evaluation, daily.

## 2025-07-08 NOTE — PROGRESS NOTES
[facility] EXECUTIVE HEALTH EXAM      CHIEF COMPLAINT: Executive health exam      HISTORY OF PRESENT ILLNESS: Precious Perez is a 29 y.o. female who presents here today for an executive health examination. Patient denies any acute concerns today.      PAST MEDICAL HISTORY:  Past Medical History:   Diagnosis Date    Acne     ADHD 10/30/2024    Environmental allergies     PCOS (polycystic ovarian syndrome) 10/30/2024       MEDICATIONS:    Current Outpatient Medications:     adapalene 0.3 % gel, Apply topically., Disp: , Rfl:     buPROPion (WELLBUTRIN) 75 MG tablet, Take 75 mg by mouth., Disp: , Rfl:     desloratadine (CLARINEX) 5 mg tablet, TAKE 1 TABLET BY MOUTH ONCE DAILY FOR 30 DAYS, Disp: , Rfl:     dexmethylphenidate (FOCALIN) 5 MG tablet, Take 5 mg by mouth once daily., Disp: , Rfl:     metFORMIN (GLUCOPHAGE-XR) 500 MG ER 24hr tablet, Take 500 mg by mouth., Disp: , Rfl:     montelukast (SINGULAIR) 5 MG chewable tablet, CHEW AND SWALLOW 1 TABLET BY MOUTH ONCE DAILY AS DIRECTED, Disp: , Rfl:     WINLEVI 1 % Crea, Apply topically., Disp: , Rfl:     azelastine-fluticasone (DYMISTA) 137-50 mcg/spray Spry nassal spray, USE 1 SPRAY(S) IN EACH NOSTRIL TWICE DAILY FOR 30 DAYS (Patient not taking: Reported on 7/8/2025), Disp: , Rfl:     sulfacetamide sodium-sulfur 10-5 % (w/w) Clsr, Wash face qday - bid (Patient not taking: Reported on 7/8/2025), Disp: 360 g, Rfl: 3      PAST SURGICAL HISTORY:  Past Surgical History:   Procedure Laterality Date    SEPTOPLASTY, NOSE, WITH NASAL TURBINATE REDUCTION      WISDOM TOOTH EXTRACTION         SOCIAL HISTORY:  Social History     Tobacco Use    Smoking status: Never    Smokeless tobacco: Never   Vaping Use    Vaping status: Never Used   Substance Use Topics    Alcohol use: Yes     Comment: monthly - 2 drinks    Drug use: Not Currently       ALLERGIES:  Review of patient's allergies indicates:   Allergen Reactions    Amoxicillin Other (See Comments)     Yeast infections          FAMILY HISTORY:  Family History   Problem Relation Name Age of Onset    Hearing loss Mother Malgorzata     Miscarriages / Stillbirths Mother Malgorzata     Vision loss Mother Malgorzata     Diabetes type II Father Vignesh     Diabetes Father Vignesh     Breast cancer Maternal Grandmother Eden         dx 50s (uni/bilat?), thinks recurred in (same?) breast and distantly    Cancer Maternal Grandmother Eden     Cancer Maternal Grandfather Candler         in prostate & liver (dx 50s/60s?) (+occupat. exp.)    Breast cancer Paternal Grandmother Claudette         (dx age? unilat?)    Dementia Paternal Grandmother Claudette     Cancer Paternal Grandmother Claudette     Diabetes Paternal Grandfather pgf     Diabetes type II Paternal Grandfather pgf     Prostate cancer Maternal Uncle Teb         dx 50s, metastasized    Other Maternal Uncle Teb         suspected colon polyps    ADD / ADHD Other brother     Breast cancer Other Katia         (dx age?)    Breast cancer Other Olive         (dx ~50?)    Breast cancer Maternal Aunt      Cancer Maternal Uncle Teb     Amblyopia Neg Hx      Blindness Neg Hx      Cataracts Neg Hx      Glaucoma Neg Hx      Hypertension Neg Hx      Macular degeneration Neg Hx      Retinal detachment Neg Hx      Strabismus Neg Hx      Stroke Neg Hx      Thyroid disease Neg Hx           HEALTH MAINTENANCE:     IMMUNIZATIONS:                                                              Influenza: defer until next season  COVID: yes, updated deferred until next season  RSV: not indicated  Tdap: last in 2018, updated not indicated  HPV: not indicated  Shingles: not indicated  Pneumonia: not indicated    SCREENINGS        Cervical cancer: next due 2027  Breast cancer: not indicated  Colon cancer: not indicated  Lung cancer: not indicated  HIV: ordered  Hepatitis C: not indicated  STI: not indicated  Diabetes: ordered  Lipids: ordered  AAA: not indicated  Bone density: not indicated  Anxiety and depression: mild anxiety and  "depression, well controlled   Domestic violence: denies    LIFESTYLE         Exercise: yes, strength training, pilates, yoga, cycles   Diet: healthy - focus on proteins and vegetables   Substance use: as above  Employment: [employer]    Family & living situation: [place]     INTERVENTIONS        Folate: not indicated  Contraception: not indicated  Statin: not indicated  Aspirin: not indicated          PHYSICAL EXAM:    /64 (BP Location: Left arm, Patient Position: Sitting)   Pulse 65   Ht 5' 9" (1.753 m)   Wt 75.7 kg (166 lb 14.2 oz)   SpO2 97%   BMI 24.65 kg/m²     Physical Exam  Vitals and nursing note reviewed.   Constitutional:       General: She is not in acute distress.     Appearance: Normal appearance. She is not ill-appearing, toxic-appearing or diaphoretic.   HENT:      Head: Normocephalic and atraumatic.      Nose: Nose normal.   Eyes:      Extraocular Movements: Extraocular movements intact.      Conjunctiva/sclera: Conjunctivae normal.      Pupils: Pupils are equal, round, and reactive to light.   Cardiovascular:      Rate and Rhythm: Normal rate and regular rhythm.      Heart sounds: Normal heart sounds. No murmur heard.  Pulmonary:      Effort: Pulmonary effort is normal. No respiratory distress.      Breath sounds: Normal breath sounds. No wheezing.   Musculoskeletal:         General: No deformity. Normal range of motion.   Skin:     Findings: No lesion or rash.   Neurological:      General: No focal deficit present.      Mental Status: She is alert.      Motor: No weakness.      Gait: Gait normal.   Psychiatric:         Mood and Affect: Mood normal.         Behavior: Behavior normal.         Thought Content: Thought content normal.         Judgment: Judgment normal.                LAB REVIEW:    Lab Results   Component Value Date     07/08/2025    K 4.4 07/08/2025     07/08/2025    CO2 27 07/08/2025    BUN 12 07/08/2025    CREATININE 0.7 07/08/2025    CALCIUM " 9.4 07/08/2025    ANIONGAP 6 (L) 07/08/2025    EGFRNORACEVR >60 07/08/2025       Lab Results   Component Value Date    ALT 17 07/08/2025    AST 13 07/08/2025    ALKPHOS 60 07/08/2025    BILITOT 0.7 07/08/2025       Lab Results   Component Value Date    WBC 8.41 07/08/2025    HGB 13.2 07/08/2025    HCT 40.9 07/08/2025    MCV 87 07/08/2025     07/08/2025       Lab Results   Component Value Date    TSH 1.819 07/08/2025       Lab Results   Component Value Date    HGBA1C 4.7 07/08/2025       Cholesterol Total   Date Value Ref Range Status   07/08/2025 162 120 - 199 mg/dL Final     Comment:     The National Cholesterol Education Program (NCEP) has set the  following guidelines (reference ranges) for Cholesterol:  Optimal.....................<200 mg/dL  Borderline High.............200-239 mg/dL  High........................> or = 240 mg/dL     Cholesterol   Date Value Ref Range Status   04/26/2024 148 120 - 199 mg/dL Final     Comment:     The National Cholesterol Education Program (NCEP) has set the  following guidelines (reference ranges) for Cholesterol:  Optimal.....................<200 mg/dL  Borderline High.............200-239 mg/dL  High........................> or = 240 mg/dL       HDL Cholesterol   Date Value Ref Range Status   07/08/2025 52 40 - 75 mg/dL Final     Comment:     The National Cholesterol Education Program (NCEP) has set the   following guidelines (reference values) for HDL Cholesterol:   Low...............<40 mg/dL   Optimal...........>60 mg/dL     LDL Cholesterol   Date Value Ref Range Status   07/08/2025 93.6 63.0 - 159.0 mg/dL Final     Comment:     The National Cholesterol Education Program (NCEP) has set the  following guidelines (reference values) for LDL Cholesterol:  Optimal.......................<130 mg/dL  Borderline High...............130-159 mg/dL  High..........................160-189 mg/dL  Very High.....................>190 mg/dL  LDL calculated using the Friedewald equation.      Triglycerides   Date Value Ref Range Status   04/26/2024 89 30 - 150 mg/dL Final     Comment:     The National Cholesterol Education Program (NCEP) has set the  following guidelines (reference values) for triglycerides:  Normal......................<150 mg/dL  Borderline High.............150-199 mg/dL  High........................200-499 mg/dL       Triglyceride   Date Value Ref Range Status   07/08/2025 82 30 - 150 mg/dL Final     Comment:     The National Cholesterol Education Program (NCEP) has set the  following guidelines (reference values) for triglycerides:  Normal......................<150 mg/dL  Borderline High.............150-199 mg/dL  High........................200-499 mg/dL       Lab Results   Component Value Date    [id number] Non-Reactive 07/08/2025             IMAGING:    EKG    Results for orders placed or performed during the hospital encounter of 07/08/25   EKG 12-lead    Collection Time: 07/08/25  8:46 AM   Result Value Ref Range    QRS Duration 94 ms    [facility] QTC Calculation 453 ms    Narrative    Test Reason : Z00.00,    Vent. Rate :  81 BPM     Atrial Rate :  81 BPM     P-R Int : 116 ms          QRS Dur :  94 ms      QT Int : 390 ms       P-R-T Axes :  49  79  60 degrees    QTcB Int : 453 ms    Normal sinus rhythm  Normal ECG  When compared with ECG of 26-Apr-2024 09:47,  No significant change was found  Confirmed by Andrew Fields ([id number]) on 7/8/2025 9:34:21 AM    Referred By: ISHMAEL SANTIAGO           Confirmed By: Andrew Fields               ASSESSMENT & PLAN:    1. Annual physical exam  Health screenings and immunizations due as below  History, physical exam findings, imaging results, and lab results are all acceptable with exception of what is detailed below      2. PCOS (polycystic ovarian syndrome)  Assessment & Plan:  Stable, well controlled  Following with endocrinologist    Current regimen metformin  Continue current treatment plan       3. Acne, unspecified acne type  Assessment &  Plan:  Stable, well controlled   Current regimen adapalene  Continue current treatment plan       4. Attention deficit hyperactivity disorder (ADHD), unspecified ADHD type  Assessment & Plan:  Stable, well controlled  Follows with psychiatry   Current regimen focalin   Continue current treatment plan      5. Environmental allergies  Assessment & Plan:  Stable, well controlled  Follows with allergist   Current regimen clarinex, singulair  Continue current treatment plan         I recommend influenza vaccine and COVID booster this fall.             Raquel Pandya MD  [facility]  07/08/2025

## 2025-07-08 NOTE — ASSESSMENT & PLAN NOTE
Stable, well controlled  Follows with psychiatry   Current regimen focalin   Continue current treatment plan

## 2025-07-08 NOTE — ASSESSMENT & PLAN NOTE
Stable, well controlled  Following with endocrinologist    Current regimen metformin  Continue current treatment plan

## 2025-07-08 NOTE — PROGRESS NOTES
"Nutrition Assessment  Session Time:  30 minutes      Client name:  Precious Perez  :  1995  Age:  29 y.o.  Gender:  female    Client states:  Very pleasant [employer] employee here for her annual [facility] physical.  Originally from [place], [place] and has been employed with [employer] for nearly six years.  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  Recalls hx of PCOS (dg ) and expresses nutrition-related knowledge regarding such.  Takes Metformin 2x/day.  Maintains a healthy and active lifestyle, exercising for ~50 minutes 5x/week, including weight training and cardio.  Is also conscious of PO intake, noting difficulty of eating healthy when offshore due to ubiquitous nature of food.  Nevertheless, brings protein bars, nuts, and other such healthy snacks from home when going offshore.  Adds that her intake is rather routine when offshore, recalling breakfast inclusive of egg whites with spinach and bell pepper and oatmeal with blueberries, strawberries, pecans, cinnamon, and brown sugar.  Typically has chicken, CHO (rice, potatoes, beans, etc.), and vegetables for lunch and dinner.  May snack on Greek yogurt, dried fruit, nuts, protein bars, etc.  Limits intake of sugary beverages, drinking mostly water, coffee, and an occasional Diet Coke.  Finds that at times, she awakens hungry throughout the night but is able to fall back asleep without too much difficulty.  Overall, wishes to remain proactive in managing health, specifically PCOS.    Anthropometrics  Height:  5' 9"     Weight:  164.8#  BMI:  24.3  % Body Fat:  37.3%    Clinical Signs/Symptoms  N/V/D:  None  Appetite:  good       Past Medical History:   Diagnosis Date    Acne     Anxiety disorder, unspecified     Back injury     Depression     Environmental allergies        Past Surgical History:   Procedure Laterality Date    SEPTOPLASTY, NOSE, WITH NASAL TURBINATE REDUCTION      WISDOM TOOTH EXTRACTION         Medications    has a current " medication list which includes the following prescription(s): adapalene, azelastine-fluticasone, bupropion, desloratadine, dexmethylphenidate, metformin, montelukast, spironolactone, sulfacetamide sodium-sulfur, and winlevi.    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)    Social History     Tobacco Use    Smoking status: Never    Smokeless tobacco: Never   Substance Use Topics    Alcohol use: Yes        Lab Reports   Sodium   Date Value Ref Range Status   07/08/2025 141 136 - 145 mmol/L Final   04/26/2024 143 136 - 145 mmol/L Final     Potassium   Date Value Ref Range Status   07/08/2025 4.4 3.5 - 5.1 mmol/L Final   04/26/2024 4.0 3.5 - 5.1 mmol/L Final     Chloride   Date Value Ref Range Status   07/08/2025 108 95 - 110 mmol/L Final   04/26/2024 107 95 - 110 mmol/L Final     CO2   Date Value Ref Range Status   07/08/2025 27 23 - 29 mmol/L Final   04/26/2024 27 23 - 29 mmol/L Final     Glucose   Date Value Ref Range Status   07/08/2025 93 70 - 110 mg/dL Final   04/26/2024 96 70 - 110 mg/dL Final     BUN   Date Value Ref Range Status   07/08/2025 12 6 - 20 mg/dL Final     Creatinine   Date Value Ref Range Status   07/08/2025 0.7 0.5 - 1.4 mg/dL Final     Calcium   Date Value Ref Range Status   07/08/2025 9.4 8.7 - 10.5 mg/dL Final   04/26/2024 9.8 8.7 - 10.5 mg/dL Final     Protein Total   Date Value Ref Range Status   07/08/2025 7.5 6.0 - 8.4 gm/dL Final     Total Protein   Date Value Ref Range Status   04/26/2024 7.7 6.0 - 8.4 g/dL Final     Albumin   Date Value Ref Range Status   07/08/2025 4.3 3.5 - 5.2 g/dL Final   04/26/2024 4.2 3.5 - 5.2 g/dL Final     Total Bilirubin   Date Value Ref Range Status   04/26/2024 0.5 0.1 - 1.0 mg/dL Final     Comment:     For infants and newborns, interpretation of results  should be based  on gestational age, weight and in agreement with clinical  observations.    Premature Infant recommended reference ranges:  Up to 24 hours.............<8.0 mg/dL  Up to 48 hours............<12.0 mg/dL  3-5 days..................<15.0 mg/dL  6-29 days.................<15.0 mg/dL       Bilirubin Total   Date Value Ref Range Status   07/08/2025 0.7 0.1 - 1.0 mg/dL Final     Comment:     For infants and newborns, interpretation of results should be based   on gestational age, weight and in agreement with clinical   observations.    Premature Infant recommended reference ranges:   0-24 hours:  <8.0 mg/dL   24-48 hours: <12.0 mg/dL   3-5 days:    <15.0 mg/dL   6-29 days:   <15.0 mg/dL     Alkaline Phosphatase   Date Value Ref Range Status   04/26/2024 66 55 - 135 U/L Final     ALP   Date Value Ref Range Status   07/08/2025 60 40 - 150 unit/L Final     AST   Date Value Ref Range Status   07/08/2025 13 11 - 45 unit/L Final   04/26/2024 15 10 - 40 U/L Final     ALT   Date Value Ref Range Status   07/08/2025 17 10 - 44 unit/L Final   04/26/2024 21 10 - 44 U/L Final     Anion Gap   Date Value Ref Range Status   07/08/2025 6 (L) 8 - 16 mmol/L Final      Lab Results   Component Value Date    WBC 8.41 07/08/2025    HGB 13.2 07/08/2025    HCT 40.9 07/08/2025    MCV 87 07/08/2025     07/08/2025        Lab Results   Component Value Date    CHOL 162 07/08/2025     Lab Results   Component Value Date    HDL 52 07/08/2025     Lab Results   Component Value Date    LDLCALC 93.6 07/08/2025     Lab Results   Component Value Date    TRIG 82 07/08/2025     Lab Results   Component Value Date    CHOLHDL 32.1 07/08/2025     Lab Results   Component Value Date    HGBA1C 4.7 07/08/2025     BP Readings from Last 1 Encounters:   04/26/24 (!) 98/58       Food History  Breakfast:  Egg whites with spinach and bell pepper + oatmeal with blueberries, strawberries, pecans, cinnamon, and brown sugar + water + coffee  Mid-morning  Snack:  Dried fruit/[employer] yogurt  Lunch:  Soup + salad/chicken breast + rice + beans + salad or sauteed vegetables  Mid-afternoon Snack:  Protein bar  Dinner:  Grilled chicken breast + vegetable + rice  Snack:  +/- Greek yogurt/applesauce/almonds  *Fluid intake:  Coffee, water, Diet Coke    Exercise History:  ~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     Intervention    RMR (Method:  InBody):  1382 kcal  Activity Factor:  1.4    SONAL:  1934 kcal    Goals:  1.  Maintain healthy eating habits and active lifestyle as tolerated  2.  Maintain CHO distribution among meals and snacks as well as protein pairing     Nutrition Education  The following education was provided to the patient:  Complimented patient on proactive role in health maintenance.  Complimented patient on physical activity efforts.  Discussed meal planning/[employer] My Plate design.  Discussed macronutrient distribution among meals and snacks, including CHO, protein, and fat recommendations and its importance/benefits.  Discussed physical activity guidelines and its associated benefits.  Discussed nutrition-related lab values and dietary and/or lifestyle factors affecting them.  Discussed recommended protein intake (may include but not limited to recommended food sources, benefits of adequate protein intake, importance of protein distribution, etc.)   Discussed recommended servings of non-starchy vegetables/day and food sources of such.  Discussed recommended fiber intake and food sources of such.  Discussed nutrition therapy related to PCOS.    Patient verbalized understanding of nutrition education and recommendations received.    Handouts Provided  Meal Planning Guide  Eat Fit Shopping List  [facility] Well  On-The-Go    Monitoring/Evaluation    Monitor the following:  Weight  BMI  % Body Fat  Caloric intake  HbA1C  CMP    Follow Up Plan:  Communication with referring healthcare provider is unnecessary at this time as patient presented as part of annual wellness exam.  However, will follow up with patient in 1-2 years.

## 2025-07-08 NOTE — LETTER
July 8, 2025    Precious Perez  [address] [facility] [address]             [facility]  [address]  [facility] [address]  Phone: [phone]  Fax: [phone] Dear Ms. Perez:      It was a pleasure seeing you in clinic for your [facility] exam on 7/8/2025. Enclosed is a copy of the clinic note detailing the history, physical exam findings, laboratory studies, and recommendations at that time. Thank you for allowing me to participate in your medical care.        If you have any questions or concerns, please don't hesitate to call.        Sincerely,        Raquel Pandya MD

## 2025-07-08 NOTE — ASSESSMENT & PLAN NOTE
Stable, well controlled  Follows with allergist   Current regimen clarinex, singulair  Continue current treatment plan